# Patient Record
Sex: MALE | Race: WHITE | Employment: OTHER | URBAN - METROPOLITAN AREA
[De-identification: names, ages, dates, MRNs, and addresses within clinical notes are randomized per-mention and may not be internally consistent; named-entity substitution may affect disease eponyms.]

---

## 2018-11-11 ENCOUNTER — HOSPITAL ENCOUNTER (EMERGENCY)
Age: 38
Discharge: HOME | End: 2018-11-12
Payer: COMMERCIAL

## 2018-11-11 VITALS
HEIGHT: 74 IN | SYSTOLIC BLOOD PRESSURE: 150 MMHG | RESPIRATION RATE: 20 BRPM | TEMPERATURE: 98 F | DIASTOLIC BLOOD PRESSURE: 101 MMHG | WEIGHT: 300 LBS | OXYGEN SATURATION: 95 % | BODY MASS INDEX: 38.5 KG/M2 | HEART RATE: 84 BPM

## 2018-11-11 DIAGNOSIS — S05.02XA ABRASION OF LEFT CORNEA, INITIAL ENCOUNTER: Primary | ICD-10-CM

## 2018-11-11 PROCEDURE — 99282 EMERGENCY DEPT VISIT SF MDM: CPT

## 2018-11-11 ASSESSMENT — PAIN DESCRIPTION - ORIENTATION: ORIENTATION: LEFT

## 2018-11-11 ASSESSMENT — VISUAL ACUITY
OS: 20/25
OU: 20/25
OD: 20/25

## 2018-11-11 ASSESSMENT — PAIN SCALES - GENERAL: PAINLEVEL_OUTOF10: 5

## 2018-11-11 ASSESSMENT — PAIN DESCRIPTION - LOCATION: LOCATION: EYE

## 2018-11-11 ASSESSMENT — PAIN DESCRIPTION - PAIN TYPE: TYPE: ACUTE PAIN

## 2018-11-12 PROCEDURE — 6370000000 HC RX 637 (ALT 250 FOR IP): Performed by: NURSE PRACTITIONER

## 2018-11-12 RX ORDER — SULFACETAMIDE SODIUM 100 MG/ML
2 SOLUTION/ DROPS OPHTHALMIC ONCE
Status: COMPLETED | OUTPATIENT
Start: 2018-11-12 | End: 2018-11-12

## 2018-11-12 RX ADMIN — SULFACETAMIDE SODIUM 2 DROP: 100 SOLUTION/ DROPS OPHTHALMIC at 01:05

## 2018-11-14 NOTE — ED PROVIDER NOTES
EVALUATED BY THE EILEEN    CHIEF COMPLAINT  Eye Pain (left)      HISTORY OF PRESENT ILLNESS  Titi Agarwal is a 43 y.o. male who presents to the ED for evaluation of left eye irritation. Patient states like he got something in his left eye prior to arrival in the ER. Patient states that he has been rubbing his eye. No trauma. Does not wear contacts. Patient denies vision changes. Here for further evaluation. No alleviating or aggravating factors noted. No other complaints, modifying factors or associated symptoms. Nursing notes reviewed. History reviewed. No pertinent past medical history. Past Surgical History:   Procedure Laterality Date    HERNIA REPAIR  1985    left     SPINE SURGERY  1998    for spondylolisthesis -      Family History   Problem Relation Age of Onset    Diabetes Mother      Social History     Social History    Marital status:      Spouse name: N/A    Number of children: N/A    Years of education: N/A     Occupational History    Not on file. Social History Main Topics    Smoking status: Never Smoker    Smokeless tobacco: Not on file    Alcohol use 1.2 oz/week     2 Cans of beer per week      Comment: occas - 10/month    Drug use: No    Sexual activity: Not on file     Other Topics Concern    Not on file     Social History Narrative    No narrative on file     No current facility-administered medications for this encounter. No current outpatient prescriptions on file. No Known Allergies    REVIEW OF SYSTEMS  6 systems reviewed, pertinent positives per HPI otherwise noted to be negative    PHYSICAL EXAM  BP (!) 150/101   Pulse 84   Temp 98 °F (36.7 °C) (Oral)   Resp 20   Ht 6' 2\" (1.88 m)   Wt 300 lb (136.1 kg)   SpO2 95%   BMI 38.52 kg/m²   GENERAL APPEARANCE: Awake and alert. Cooperative. No acute distress. HEAD: Normocephalic. Atraumatic. EYES: PERRL. EOM's grossly intact. There is scleral injectio noted to left eye.  Moderate area of

## 2019-03-29 ENCOUNTER — OFFICE VISIT (OUTPATIENT)
Dept: FAMILY MEDICINE CLINIC | Age: 39
End: 2019-03-29
Payer: COMMERCIAL

## 2019-03-29 VITALS
SYSTOLIC BLOOD PRESSURE: 106 MMHG | HEART RATE: 50 BPM | BODY MASS INDEX: 35.99 KG/M2 | OXYGEN SATURATION: 98 % | HEIGHT: 74 IN | WEIGHT: 280.4 LBS | DIASTOLIC BLOOD PRESSURE: 60 MMHG

## 2019-03-29 DIAGNOSIS — B07.9 VIRAL WARTS, UNSPECIFIED TYPE: ICD-10-CM

## 2019-03-29 DIAGNOSIS — Z00.00 WELL ADULT EXAM: Primary | ICD-10-CM

## 2019-03-29 LAB
A/G RATIO: 1.6 (ref 1.1–2.2)
ALBUMIN SERPL-MCNC: 4.5 G/DL (ref 3.4–5)
ALP BLD-CCNC: 96 U/L (ref 40–129)
ALT SERPL-CCNC: 39 U/L (ref 10–40)
ANION GAP SERPL CALCULATED.3IONS-SCNC: 13 MMOL/L (ref 3–16)
AST SERPL-CCNC: 24 U/L (ref 15–37)
BASOPHILS ABSOLUTE: 0 K/UL (ref 0–0.2)
BASOPHILS RELATIVE PERCENT: 0.6 %
BILIRUB SERPL-MCNC: 0.3 MG/DL (ref 0–1)
BILIRUBIN, POC: 0
BLOOD URINE, POC: 0
BUN BLDV-MCNC: 11 MG/DL (ref 7–20)
CALCIUM SERPL-MCNC: 9.3 MG/DL (ref 8.3–10.6)
CHLORIDE BLD-SCNC: 105 MMOL/L (ref 99–110)
CHOLESTEROL, TOTAL: 184 MG/DL (ref 0–199)
CLARITY, POC: CLEAR
CO2: 22 MMOL/L (ref 21–32)
COLOR, POC: NORMAL
CREAT SERPL-MCNC: 0.7 MG/DL (ref 0.9–1.3)
EOSINOPHILS ABSOLUTE: 0.2 K/UL (ref 0–0.6)
EOSINOPHILS RELATIVE PERCENT: 3.4 %
GFR AFRICAN AMERICAN: >60
GFR NON-AFRICAN AMERICAN: >60
GLOBULIN: 2.9 G/DL
GLUCOSE BLD-MCNC: 92 MG/DL (ref 70–99)
GLUCOSE URINE, POC: 0
HCT VFR BLD CALC: 47.8 % (ref 40.5–52.5)
HDLC SERPL-MCNC: 36 MG/DL (ref 40–60)
HEMOGLOBIN: 16.2 G/DL (ref 13.5–17.5)
KETONES, POC: 0
LDL CHOLESTEROL CALCULATED: 120 MG/DL
LEUKOCYTE EST, POC: 0
LYMPHOCYTES ABSOLUTE: 2.9 K/UL (ref 1–5.1)
LYMPHOCYTES RELATIVE PERCENT: 40.4 %
MCH RBC QN AUTO: 29.4 PG (ref 26–34)
MCHC RBC AUTO-ENTMCNC: 33.8 G/DL (ref 31–36)
MCV RBC AUTO: 86.9 FL (ref 80–100)
MONOCYTES ABSOLUTE: 0.6 K/UL (ref 0–1.3)
MONOCYTES RELATIVE PERCENT: 8 %
NEUTROPHILS ABSOLUTE: 3.4 K/UL (ref 1.7–7.7)
NEUTROPHILS RELATIVE PERCENT: 47.6 %
NITRITE, POC: 0
PDW BLD-RTO: 12.9 % (ref 12.4–15.4)
PH, POC: 6.5
PLATELET # BLD: 240 K/UL (ref 135–450)
PLATELET SLIDE REVIEW: ADEQUATE
PMV BLD AUTO: 8.2 FL (ref 5–10.5)
POTASSIUM SERPL-SCNC: 4.6 MMOL/L (ref 3.5–5.1)
PROTEIN, POC: 0
RBC # BLD: 5.49 M/UL (ref 4.2–5.9)
SODIUM BLD-SCNC: 140 MMOL/L (ref 136–145)
SPECIFIC GRAVITY, POC: 1.03
TOTAL PROTEIN: 7.4 G/DL (ref 6.4–8.2)
TRIGL SERPL-MCNC: 138 MG/DL (ref 0–150)
UROBILINOGEN, POC: 0.2
VLDLC SERPL CALC-MCNC: 28 MG/DL
WBC # BLD: 7.1 K/UL (ref 4–11)

## 2019-03-29 PROCEDURE — 99385 PREV VISIT NEW AGE 18-39: CPT | Performed by: FAMILY MEDICINE

## 2019-03-29 PROCEDURE — 81002 URINALYSIS NONAUTO W/O SCOPE: CPT | Performed by: FAMILY MEDICINE

## 2019-03-29 ASSESSMENT — PATIENT HEALTH QUESTIONNAIRE - PHQ9
1. LITTLE INTEREST OR PLEASURE IN DOING THINGS: 0
SUM OF ALL RESPONSES TO PHQ QUESTIONS 1-9: 0
SUM OF ALL RESPONSES TO PHQ9 QUESTIONS 1 & 2: 0
2. FEELING DOWN, DEPRESSED OR HOPELESS: 0
SUM OF ALL RESPONSES TO PHQ QUESTIONS 1-9: 0

## 2019-03-29 NOTE — PROGRESS NOTES
Subjective:      Patient ID: Savi Amin is a 43 y.o. male. HPI   Here for CPE. [place]. Turning 39 this July - no known FH of prostate or colon CA. Wart - near R wrist, did go away, eventually returned, and second one (smaller) more proximal R radial forearm. No self-tx recently. Diet - fair, likes some veggies/fruits, occasional fast foods. Work - outdoor plumbing, doing for a while. Energy decent, and sleeping fair/good, 7-8 hrs/night, no problems. Quit 10-15 yrs ago. Cigarettes - 5 yrs, 1 PP week. Review of Systems   Constitutional: Negative for chills, fatigue and fever. HENT: Negative for ear pain and hearing loss. Eyes: Negative for pain and visual disturbance. Respiratory: Negative for cough and shortness of breath. Cardiovascular: Negative for chest pain and palpitations. Gastrointestinal: Negative for abdominal pain, diarrhea and vomiting. Genitourinary: Negative for difficulty urinating and dysuria. Musculoskeletal: Negative for arthralgias and gait problem. Neurological: Negative for dizziness, weakness and numbness. Psychiatric/Behavioral: Negative for dysphoric mood. The patient is not nervous/anxious. Objective:   Physical Exam   Constitutional: He is oriented to person, place, and time. He appears well-developed and well-nourished. HENT:   Head: Normocephalic and atraumatic. Right Ear: External ear normal.   Left Ear: External ear normal.   Mouth/Throat: Oropharynx is clear and moist.   Eyes: Conjunctivae and EOM are normal. No scleral icterus. Neck: Neck supple. No tracheal deviation present. Cardiovascular: Normal rate, regular rhythm, normal heart sounds and intact distal pulses. Pulmonary/Chest: Effort normal and breath sounds normal.   Abdominal: Soft. There is no tenderness. Lymphadenopathy:     He has no cervical adenopathy. Neurological: He is alert and oriented to person, place, and time. Skin: Skin is warm and dry.    R distal forearm - one larger approx. 4mm norbert. \"stuck-on\" domed hyperkeratotic papule (c/w common mole), and similar but smaller (1-2mm max) domed hyperkeratotic papule mid-radial papule. Psychiatric: He has a normal mood and affect. Nursing note and vitals reviewed. Assessment:      Encounter Diagnoses   Name Primary?  Well adult exam Yes    Viral warts, unspecified type          Plan:      Per orders - await results. Advise good low-fat and low sweets diet, also continue/increase regular activity/exercise as able. If feeling well, and labs stable, then repeat fasting office visit in 6 months, sooner as needed.  Cryo x 2 to the (two) lara          Eduardo Henson MD

## 2019-04-01 ASSESSMENT — ENCOUNTER SYMPTOMS
VOMITING: 0
COUGH: 0
ABDOMINAL PAIN: 0
SHORTNESS OF BREATH: 0
EYE PAIN: 0
DIARRHEA: 0

## 2019-04-01 NOTE — PATIENT INSTRUCTIONS
Advise good low-fat and low sweets diet, also continue/increase regular 3/exercise as able. If feeling well, and labs stable, then repeat fasting office visit in 6 months, sooner seated. For the apparent warts - call or return if any significant increasing redness or pain, also call if no apparent improvement over the next 1-3 weeks, sooner as needed.

## 2019-08-09 ENCOUNTER — PROCEDURE VISIT (OUTPATIENT)
Dept: FAMILY MEDICINE CLINIC | Age: 39
End: 2019-08-09
Payer: COMMERCIAL

## 2019-08-09 VITALS
BODY MASS INDEX: 34.7 KG/M2 | HEIGHT: 74 IN | DIASTOLIC BLOOD PRESSURE: 70 MMHG | HEART RATE: 57 BPM | WEIGHT: 270.4 LBS | OXYGEN SATURATION: 98 % | SYSTOLIC BLOOD PRESSURE: 104 MMHG

## 2019-08-09 DIAGNOSIS — R20.9 DISTURBANCE OF SKIN SENSATION: ICD-10-CM

## 2019-08-09 DIAGNOSIS — B07.9 VIRAL WARTS, UNSPECIFIED TYPE: Primary | ICD-10-CM

## 2019-08-09 PROCEDURE — 17110 DESTRUCTION B9 LES UP TO 14: CPT | Performed by: PHYSICIAN ASSISTANT

## 2019-09-06 ENCOUNTER — OFFICE VISIT (OUTPATIENT)
Dept: FAMILY MEDICINE CLINIC | Age: 39
End: 2019-09-06
Payer: COMMERCIAL

## 2019-09-06 VITALS
HEIGHT: 74 IN | DIASTOLIC BLOOD PRESSURE: 84 MMHG | BODY MASS INDEX: 35.01 KG/M2 | OXYGEN SATURATION: 94 % | SYSTOLIC BLOOD PRESSURE: 110 MMHG | HEART RATE: 99 BPM | WEIGHT: 272.8 LBS

## 2019-09-06 DIAGNOSIS — B07.9 VIRAL WARTS, UNSPECIFIED TYPE: Primary | ICD-10-CM

## 2019-09-06 DIAGNOSIS — R20.9 DISTURBANCE OF SKIN SENSATION: ICD-10-CM

## 2019-09-06 PROCEDURE — 17110 DESTRUCTION B9 LES UP TO 14: CPT | Performed by: PHYSICIAN ASSISTANT

## 2021-02-10 ENCOUNTER — TELEPHONE (OUTPATIENT)
Dept: FAMILY MEDICINE CLINIC | Age: 41
End: 2021-02-10

## 2021-02-10 NOTE — TELEPHONE ENCOUNTER
Was a pt of Dr. Astrid Warren and he has not re-established with anyone else since he left. Pt would like to establish with [facility] if she will accept. He has had a Heavy cough for a week. I advised pt that we would not be able to see him in person since we are a green clinic. He understood.

## 2021-02-11 ENCOUNTER — VIRTUAL VISIT (OUTPATIENT)
Dept: FAMILY MEDICINE CLINIC | Age: 41
End: 2021-02-11
Payer: COMMERCIAL

## 2021-02-11 DIAGNOSIS — Z20.822 ENCOUNTER BY TELEHEALTH FOR SUSPECTED COVID-19: Primary | ICD-10-CM

## 2021-02-11 DIAGNOSIS — R05.9 COUGH: ICD-10-CM

## 2021-02-11 PROCEDURE — 99203 OFFICE O/P NEW LOW 30 MIN: CPT | Performed by: PHYSICIAN ASSISTANT

## 2021-02-11 RX ORDER — BENZONATATE 100 MG/1
100 CAPSULE ORAL 3 TIMES DAILY PRN
Qty: 20 CAPSULE | Refills: 0 | Status: SHIPPED | OUTPATIENT
Start: 2021-02-11 | End: 2021-03-03

## 2021-02-11 SDOH — ECONOMIC STABILITY: FOOD INSECURITY: WITHIN THE PAST 12 MONTHS, YOU WORRIED THAT YOUR FOOD WOULD RUN OUT BEFORE YOU GOT MONEY TO BUY MORE.: NEVER TRUE

## 2021-02-11 SDOH — ECONOMIC STABILITY: TRANSPORTATION INSECURITY
IN THE PAST 12 MONTHS, HAS THE LACK OF TRANSPORTATION KEPT YOU FROM MEDICAL APPOINTMENTS OR FROM GETTING MEDICATIONS?: NO

## 2021-02-11 ASSESSMENT — PATIENT HEALTH QUESTIONNAIRE - PHQ9
2. FEELING DOWN, DEPRESSED OR HOPELESS: 0
1. LITTLE INTEREST OR PLEASURE IN DOING THINGS: 0
SUM OF ALL RESPONSES TO PHQ QUESTIONS 1-9: 0
SUM OF ALL RESPONSES TO PHQ QUESTIONS 1-9: 0

## 2021-02-11 NOTE — PROGRESS NOTES
21     TELEHEALTH EVALUATION -- Audio/Visual (During [facility]- public health emergency)      HPI:  Chief Complaint   Patient presents with    Cough     for one week         Stacia Osler (:  1980) has requested an audio/video evaluation for the following concern(s): as per chief complaint. NEW TO PROVIDER. Began 7 days ago with a dry, hacking cough, nasal congestion, post-nasal drip, and decreased appetite. Cough is productive- green in mornings. Wife has had a similar but less severe cough. Never had anything like this before. Non-smoker. Exposure to COVID?-No known exposure. Remedies tried are mucinex with some relief. ROS: Denies anosmia, fever, myalgias/arthralgias, headache, ear symptoms, purulent nasal discharge, sneezing, sore throat, shortness of breath, wheezing/chest tightness, dizziness, eye symptoms, rash, nausea/vomiting, diarrhea and neck pain. Prior to Visit Medications    Medication Sig Taking? Authorizing Provider   benzonatate (TESSALON) 100 MG capsule Take 1 capsule by mouth 3 times daily as needed for Cough Yes [facility] [place]       Social History     Tobacco Use    Smoking status: Never Smoker    Smokeless tobacco: Never Used   Substance Use Topics    Alcohol use: Yes     Alcohol/week: 0.0 - 1.0 standard drinks     Comment: occas - 5/month    Drug use: No        No Known Allergies, History reviewed. No pertinent past medical history. ,   Past Surgical History:   Procedure Laterality Date    HERNIA REPAIR      left     SPINE SURGERY      for spondylolisthesis -    ,   Social History     Tobacco Use    Smoking status: Never Smoker    Smokeless tobacco: Never Used   Substance Use Topics    Alcohol use:  Yes     Alcohol/week: 0.0 - 1.0 standard drinks     Comment: occas - 5/month    Drug use: No       PHYSICAL EXAMINATION:    Patient-Reported Vitals 2021   Patient-Reported Weight 255.0 lb   Patient-Reported Height 6 2 Patient-Reported Temperature 98.7         Constitutional: [x] Appears well-developed and well-nourished [x] No apparent distress      [] Abnormal- Looks sick, non toxic   Mental status  [x] Alert and awake  [x] Oriented to person/place/time [x]Able to follow commands      Eyes:  EOM    [x]  Normal  [] Abnormal-  Sclera  [x]  Normal  [] Abnormal -         Discharge [x]  None visible  [] Abnormal -    HENT:   [x] Normocephalic, atraumatic. [] Abnormal   [x] Mouth/Throat: Mucous membranes are moist.     External Ears [x] Normal  [] Abnormal-     Neck: [x] No visualized mass     Pulmonary/Chest: [x] Respiratory effort normal.  [x] No visualized signs of difficulty breathing or respiratory distress        [] Abnormal-      Musculoskeletal:   [] Normal gait with no signs of ataxia         [x] Normal range of motion of neck        [] Abnormal-       Neurological:        [x] No Facial Asymmetry (Cranial nerve 7 motor function) (limited exam to video visit)          [x] No gaze palsy        [] Abnormal-         Skin:        [x] No significant exanthematous lesions or discoloration noted on facial skin         [] Abnormal-            Psychiatric:       [x] Normal Affect [x] No Hallucinations        [] Abnormal-         ASSESSMENT/PLAN:  1. Encounter by telehealth for suspected COVID-19  2. Cough    - COVID-19 test now, number given. Have wife tested also  - Symptom relief with OTC meds such as Nyquil,. Tessalon Perles. - Rest, increase fluids. - If worse, call clinic or go to ER if dyspnea  becomes severe. 3. HEALTH MAINTENANCE:   New patient. Set up for CPE with fbw in 2 weeks.     Orders Placed This Encounter   Medications    benzonatate (TESSALON) 100 MG capsule     Sig: Take 1 capsule by mouth 3 times daily as needed for Cough     Dispense:  20 capsule     Refill:  0 Nydia Rush is a 38 y.o. male being evaluated by a Virtual Visit (video visit) encounter to address concerns as mentioned above. A caregiver was present when appropriate. Due to this being a TeleHealth encounter (During XRKOE-97 public health emergency), evaluation of the following organ systems was limited: Vitals/Constitutional/EENT/Resp/CV/GI//MS/Neuro/Skin/Heme-Lymph-Imm. Pursuant to the emergency declaration under the [address] [place] [address] and the [employer] Act, this Virtual Visit was conducted with patient's (and/or legal guardian's) consent, to reduce the patient's risk of exposure to COVID-19 and provide necessary medical care. The patient (and/or legal guardian) has also been advised to contact this office for worsening conditions or problems, and seek emergency medical treatment and/or call 911 if deemed necessary. Patient identification was verified at the start of the visit: Yes    Total time spent on this encounter: Not billed by time    Services were provided through a video synchronous discussion virtually to substitute for in-person clinic visit. Patient and provider were located at their individual homes. --[address] on 2/11/2021 at 2:06 PM    An electronic signature was used to authenticate this note.

## 2021-02-12 ENCOUNTER — OFFICE VISIT (OUTPATIENT)
Dept: PRIMARY CARE CLINIC | Age: 41
End: 2021-02-12
Payer: COMMERCIAL

## 2021-02-12 DIAGNOSIS — Z11.59 SCREENING FOR VIRAL DISEASE: Primary | ICD-10-CM

## 2021-02-12 PROCEDURE — 99211 OFF/OP EST MAY X REQ PHY/QHP: CPT | Performed by: NURSE PRACTITIONER

## 2021-02-12 NOTE — PROGRESS NOTES
Lauren Chavarria received a viral test for COVID-19. They were educated on isolation and quarantine as appropriate. For any symptoms, they were directed to seek care from their PCP, given contact information to establish with a doctor, directed to an urgent care or the emergency room.

## 2021-02-12 NOTE — PATIENT INSTRUCTIONS

## 2021-02-13 LAB — SARS-COV-2, NAA: DETECTED

## 2021-02-16 NOTE — RESULT ENCOUNTER NOTE
Unable to leave message. Voicemail not set up. Your test came back detected/positive for Covid-19. What happens if I have a positive test?  If you have symptoms:  Isolate until all three of these things are true: 1) your symptoms are better, 2) it has been 10 days since you first felt sick, and 3) you have had no fever for at least 24 hours without using medicine that lowers fever. Drink plenty of fluids and eat when you can. You may take medicine for pain or fever if you need to. Rest as much as you can. If you do not have symptoms:  Stay home for 10 days after the date you were tested. If you develop symptoms during those 10 days, stay home until all three of these things are true: 1) your symptoms are better, 2) it has been 10 days since you first felt sick, and 3) you have had no fever for at least 24 hours without using medicine that lowers fever. Follow care instructions from your doctor or other healthcare provider. Seek emergency medical care immediately if you have trouble breathing, persistent pain or pressure in the chest, new confusion, inability to wake or stay awake, or bluish lips or face. Someone from the health department (case investigator or contact tracer) may reach out to you to check on your health and ask about other people you have been around or where you've spent time while you may have been able to spread COVID-19 to others. This person's role is strictly to map the virus to help identify people who may have been exposed to the virus and prevent its spread. The local health department will also provide guidance on how to stay safely at home to avoid spreading illness. Detected results can happen for months and you are not considered contagious. No retest is necessary.

## 2021-03-10 ENCOUNTER — OFFICE VISIT (OUTPATIENT)
Dept: FAMILY MEDICINE CLINIC | Age: 41
End: 2021-03-10
Payer: COMMERCIAL

## 2021-03-10 VITALS
HEIGHT: 74 IN | RESPIRATION RATE: 17 BRPM | WEIGHT: 279 LBS | SYSTOLIC BLOOD PRESSURE: 114 MMHG | BODY MASS INDEX: 35.81 KG/M2 | HEART RATE: 88 BPM | TEMPERATURE: 97.4 F | DIASTOLIC BLOOD PRESSURE: 82 MMHG | OXYGEN SATURATION: 97 %

## 2021-03-10 DIAGNOSIS — Z00.00 ANNUAL PHYSICAL EXAM: Primary | ICD-10-CM

## 2021-03-10 DIAGNOSIS — Z86.16 HISTORY OF COVID-19: ICD-10-CM

## 2021-03-10 PROCEDURE — 36415 COLL VENOUS BLD VENIPUNCTURE: CPT | Performed by: PHYSICIAN ASSISTANT

## 2021-03-10 PROCEDURE — 99396 PREV VISIT EST AGE 40-64: CPT | Performed by: PHYSICIAN ASSISTANT

## 2021-03-10 NOTE — PROGRESS NOTES
spondylolisthesis - birth defect       Family History   Problem Relation Age of Onset    Diabetes Mother     Lung Cancer Maternal Grandfather         smoker    Lung Cancer Paternal Grandmother         smoker    Liver Cancer Paternal Grandfather         Social History     Tobacco Use    Smoking status: Never Smoker    Smokeless tobacco: Never Used   Substance Use Topics    Alcohol use: Yes     Alcohol/week: 0.0 - 1.0 standard drinks     Comment: occas - 5/month        ALLERGIES:    Patient has no known allergies. MEDICATIONS:  No current outpatient medications on file. No current facility-administered medications for this visit. PHYSICAL EXAM:     Wt Readings from Last 3 Encounters:   03/10/21 279 lb (126.6 kg)   09/06/19 272 lb 12.8 oz (123.7 kg)   08/09/19 270 lb 6.4 oz (122.7 kg)     BP Readings from Last 3 Encounters:   03/10/21 114/82   09/06/19 110/84   08/09/19 104/70     Vitals:    03/10/21 0751   BP: 114/82   Site: Left Upper Arm   Position: Sitting   Cuff Size: Large Adult   Pulse: 88   Resp: 17   Temp: 97.4 °F (36.3 °C)   TempSrc: Temporal   SpO2: 97%   Weight: 279 lb (126.6 kg)   Height: 6' 2\" (1.88 m)       Body mass index is 35.82 kg/m². GENERAL APPEARANCE:  Pleasant, friendly. Well-nourished. Looks in no distress. HEENT: Normocephalic. Atraumatic. EYES: Vision intact. EOMI, PERRLA. Conjunctivae pink and moist. Sclera white. Fundoscopic without hemorrhages or edema. Cornea and lens clear. EARS: Auricles symmetrical without lesions or deformities. Canals are clear without erythema. TM's intact bilaterally. Hearing  intact. NOSE: MOUTH/THROAT: currently not evaluated. NECK: No lymphadenopathy. Thyroid smooth, not enlarged. Spine non-tender and full range of motion without pain. LUNGS: Clear to auscultation without wheezes, rales, or rhonchi. Equal resonance to chest percussion bilaterally. CHEST/SPINE: No skin changes or chest wall deformities. No CVAT.  No spine or paraspinal muscle tenderness or deformities. Full range of motion in all planes. HEART:  Regular rate and rhythm. No murmurs, rubs, or gallops. VASCULAR:  No jugular venous distension or carotid bruits. Pulses 2+ and symmetrical to carotid, femoral, and dorsalis pedis. Capillary refill <3secs. ABDOMEN: Without scars. Soft, non-tender, normoactive bowel sounds. No pulsatile masses or hepatosplenomegaly. GENITAL RECTAL EXAM: deferred  EXTREMITIES/BACK: No new skin or bony deformities. No new erythema, edema, or exudates. Symmetrical strength. Full range of motion without eliciting pain. Sensation  and DTR's are equal and intact. Pulses equal and intact. NEUROLOGIC: Grossly non focal. Cranial Nerves II-XII intact. Negative Rhomberg. No pronator drift. Cerebellar functions intact using heel along shin/finger to nose. SKIN: Warm, dry, normal skin turgor. No rashes, petechia, purpura. No suspicious lesions. No nail clubbing or cyanosis. PSYCHIATRIC:  Answers and understands questions appropriately. Normal affect, behavior, and mood. ADDITIONAL DATA:  Prior clinic notes, labs and imaging reviewed. Lipid panel:   Lab Results   Component Value Date    TRIG 138 03/29/2019    HDL 36 03/29/2019    HDL 34 09/24/2010    LDLCALC 120 03/29/2019        ASSESSMENT & PLAN:       Annual physical exam  -     Comprehensive Metabolic Panel  -     Lipid Panel  -     TSH WITH REFLEX TO FT4  - Medical records updated. -Instructed on monthly routine skin and testicular exams.    - Discussed preventative health measures  - recommended 10-20 lb weight loss in the next year and reduce sugary food intake . Has fhx of DM    History of COVID-19  on 2/12/2021  - improved.  No sequelae    Follow up: yearly

## 2021-03-10 NOTE — PATIENT INSTRUCTIONS
Healthy Living Recommendations:  -Exercise 150 minutes per week to include aerobic and weights.  -Food intake to include more plant based and whole grains. Avoid raw sugar. Avoid greasy foods.  -Eye exam every 2 years after age 36.   -Dental exam every 6 months.  -Studies show that obesity, a diet high in red meat and processed foods, tobacco and alcohol abuse, and a sedentary lifestyle increase the risk of developing colorectal cancer.

## 2021-03-11 LAB
A/G RATIO: 1.4 (ref 1.1–2.2)
ALBUMIN SERPL-MCNC: 4.3 G/DL (ref 3.4–5)
ALP BLD-CCNC: 89 U/L (ref 40–129)
ALT SERPL-CCNC: 54 U/L (ref 10–40)
ANION GAP SERPL CALCULATED.3IONS-SCNC: 7 MMOL/L (ref 3–16)
AST SERPL-CCNC: 27 U/L (ref 15–37)
BILIRUB SERPL-MCNC: <0.2 MG/DL (ref 0–1)
BUN BLDV-MCNC: 11 MG/DL (ref 7–20)
CALCIUM SERPL-MCNC: 9.2 MG/DL (ref 8.3–10.6)
CHLORIDE BLD-SCNC: 103 MMOL/L (ref 99–110)
CHOLESTEROL, TOTAL: 191 MG/DL (ref 0–199)
CO2: 27 MMOL/L (ref 21–32)
CREAT SERPL-MCNC: 0.8 MG/DL (ref 0.9–1.3)
GFR AFRICAN AMERICAN: >60
GFR NON-AFRICAN AMERICAN: >60
GLOBULIN: 3.1 G/DL
GLUCOSE BLD-MCNC: 109 MG/DL (ref 70–99)
HDLC SERPL-MCNC: 32 MG/DL (ref 40–60)
LDL CHOLESTEROL CALCULATED: 118 MG/DL
POTASSIUM SERPL-SCNC: 4.6 MMOL/L (ref 3.5–5.1)
SODIUM BLD-SCNC: 137 MMOL/L (ref 136–145)
TOTAL PROTEIN: 7.4 G/DL (ref 6.4–8.2)
TRIGL SERPL-MCNC: 205 MG/DL (ref 0–150)
TSH REFLEX FT4: 1.29 UIU/ML (ref 0.27–4.2)
VLDLC SERPL CALC-MCNC: 41 MG/DL

## 2021-07-07 ENCOUNTER — OFFICE VISIT (OUTPATIENT)
Dept: FAMILY MEDICINE CLINIC | Age: 41
End: 2021-07-07
Payer: COMMERCIAL

## 2021-07-07 VITALS
HEIGHT: 74 IN | OXYGEN SATURATION: 98 % | BODY MASS INDEX: 35.27 KG/M2 | RESPIRATION RATE: 14 BRPM | TEMPERATURE: 98.1 F | HEART RATE: 67 BPM | DIASTOLIC BLOOD PRESSURE: 82 MMHG | SYSTOLIC BLOOD PRESSURE: 128 MMHG | WEIGHT: 274.8 LBS

## 2021-07-07 DIAGNOSIS — H92.02 OTALGIA OF LEFT EAR: ICD-10-CM

## 2021-07-07 DIAGNOSIS — H61.23 HEARING LOSS DUE TO CERUMEN IMPACTION, BILATERAL: Primary | ICD-10-CM

## 2021-07-07 PROCEDURE — 69209 REMOVE IMPACTED EAR WAX UNI: CPT | Performed by: PHYSICIAN ASSISTANT

## 2021-07-07 PROCEDURE — 99213 OFFICE O/P EST LOW 20 MIN: CPT | Performed by: PHYSICIAN ASSISTANT

## 2021-07-07 NOTE — PROGRESS NOTES
[facility]  Chief Complaint   Patient presents with    Ear Fullness     Pt states left ear feels clogged and can't hear out of        HISTORY OF PRESENT  ILLNESS  Guru Mcfarland is a 38 y.o.  male presenting with a feeling of his L ear being clogged and hearing loss that began one month ago. History ear infections in adulthood as well as cerumen build up. Been years since the last build up. Has not tried to remove it himself. Denies fever, cough, congestion, runny nose, and sore throat. Denies ear pain. Has not been swimming. ROS:  Remaining 14 ROS were reviewed and are unremarkable for other constitutional, EENT, cardiac, pulmonary, GI, , neurologic, musculoskeletal, or integumentary complaints. PAST MEDICAL/SURGICAL, SOCIAL, &  FAMILY HISTORY:  Reviewed and updated accordingly. ALLERGIES :   Patient has no known allergies. MEDICATIONS:  Prior to Visit Medications    Not on File         PHYSICAL EXAM     Vitals:    07/07/21 0807   BP: 128/82   Site: Right Upper Arm   Position: Sitting   Pulse: 67   Resp: 14   Temp: 98.1 °F (36.7 °C)   SpO2: 98%   Weight: 274 lb 12.8 oz (124.6 kg)   Height: 6' 2\" (1.88 m)   Body mass index is 35.28 kg/m². APPEARANCE: Pleasant, friendly, well-nourished. No acute distress. HEENT: Head: Normocephalic, atraumatic. Eyes: EOMI. Conjunctiva pink and moist. Sclera white. Ears: No erythema, edema, lesions or scarring on external ears. Negative pinna pull bilaterally. Canals impacted with cerumen. Decreased hearing. Post irrigation: Canals clear. TM intact bilaterally. L TM scarred. Nose/ Mouth: not examined today. NECK: No lymphadenopathy or masses. Moves neck fully. HEART: Reg rate and rhythm. No murmurs, rubs, or gallops. LUNGS: Clear to auscultation. No wheezes, rales, or rhonchi. MUSCULOSKELETAL:  No clubbing, cyanosis or edema. Moves all joints.   NEUROLOGIC: Normal gross and sensory exam.  SKIN: Warm, dry, normal turgor. Cap refill <3secs. No rashes, petechiae, purpura. ADDITIONAL STUDIES     Pertinent prior laboratory results and/or imaging reviewed. Procedure note   A solution of warm water and hydrogen peroxide was prepared. The L ear was irrigated with this solution and the remaining cerumen was removed with a curette by Chantale CORREA.   R ear cerumen was removed with a curette by Felisha Magdaleno PA-C and Chantale CORREA. IMPRESSION/ PLAN  Bilateral otalgia and hearing loss secondary to cerumen impaction   -use a wash cloth with hot water and soap to help keep canals clear and dry after  -do not use q-tips   -follow-up prn     Patient case seen and discussed under supervision of preceptor, Preeti Shleton. ORTIZ Mc. Note made by physician assistant student, Chantale Castro, in the status of a scribe, with review by preceptor.

## 2022-03-14 ENCOUNTER — OFFICE VISIT (OUTPATIENT)
Dept: FAMILY MEDICINE CLINIC | Age: 42
End: 2022-03-14
Payer: COMMERCIAL

## 2022-03-14 VITALS
WEIGHT: 280.2 LBS | HEIGHT: 74 IN | DIASTOLIC BLOOD PRESSURE: 80 MMHG | TEMPERATURE: 97.8 F | OXYGEN SATURATION: 97 % | SYSTOLIC BLOOD PRESSURE: 122 MMHG | BODY MASS INDEX: 35.96 KG/M2 | HEART RATE: 74 BPM

## 2022-03-14 DIAGNOSIS — E66.09 CLASS 2 OBESITY DUE TO EXCESS CALORIES WITH BODY MASS INDEX (BMI) OF 36.0 TO 36.9 IN ADULT, UNSPECIFIED WHETHER SERIOUS COMORBIDITY PRESENT: ICD-10-CM

## 2022-03-14 DIAGNOSIS — Z00.00 ANNUAL PHYSICAL EXAM: Primary | ICD-10-CM

## 2022-03-14 PROCEDURE — 99396 PREV VISIT EST AGE 40-64: CPT | Performed by: PHYSICIAN ASSISTANT

## 2022-03-14 ASSESSMENT — PATIENT HEALTH QUESTIONNAIRE - PHQ9
8. MOVING OR SPEAKING SO SLOWLY THAT OTHER PEOPLE COULD HAVE NOTICED. OR THE OPPOSITE, BEING SO FIGETY OR RESTLESS THAT YOU HAVE BEEN MOVING AROUND A LOT MORE THAN USUAL: 0
10. IF YOU CHECKED OFF ANY PROBLEMS, HOW DIFFICULT HAVE THESE PROBLEMS MADE IT FOR YOU TO DO YOUR WORK, TAKE CARE OF THINGS AT HOME, OR GET ALONG WITH OTHER PEOPLE: 0
5. POOR APPETITE OR OVEREATING: 0
2. FEELING DOWN, DEPRESSED OR HOPELESS: 0
4. FEELING TIRED OR HAVING LITTLE ENERGY: 0
SUM OF ALL RESPONSES TO PHQ QUESTIONS 1-9: 0
1. LITTLE INTEREST OR PLEASURE IN DOING THINGS: 0
9. THOUGHTS THAT YOU WOULD BE BETTER OFF DEAD, OR OF HURTING YOURSELF: 0
SUM OF ALL RESPONSES TO PHQ QUESTIONS 1-9: 0
SUM OF ALL RESPONSES TO PHQ QUESTIONS 1-9: 0
SUM OF ALL RESPONSES TO PHQ9 QUESTIONS 1 & 2: 0
3. TROUBLE FALLING OR STAYING ASLEEP: 0
7. TROUBLE CONCENTRATING ON THINGS, SUCH AS READING THE NEWSPAPER OR WATCHING TELEVISION: 0
SUM OF ALL RESPONSES TO PHQ QUESTIONS 1-9: 0
6. FEELING BAD ABOUT YOURSELF - OR THAT YOU ARE A FAILURE OR HAVE LET YOURSELF OR YOUR FAMILY DOWN: 0

## 2022-03-14 NOTE — PROGRESS NOTES
[facility] EXAMINATION         Date of Exam: 3/14/2022         CC:  Chief Complaint   Patient presents with    Annual Exam     Patient is here for a physical, he is not fasting for blood work          HPI: Reji Campbell 1980 is a 42 y.o. male who presents for a annual preventive health medical examination. No new complaints. New little girl 7 mo old. PREVENTIVE HEALTH:   Last eye exam:    2017  Hearing concerns: No  Last Dental exam:    Every 6 Months   Caffeine use:  >3/ day reg pop. 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 Diet: feels he needs improvement on  Cut down on sugars, has a sweet tooth  Alcohol use: Yes  1 per week. Tobacco/ Vapping use:  No Never   Cognition/ Mini Mental; concerns about forgetfulness?    no  Mental Health; concerns of anxiety or depression? no  Perform routine skin checks? No  Perform monthly testicular exams: no  Colonoscopy:  no famhx  Seatbelt use: Yes  Living will: no,   discussed            REVIEW OF SYSTEMS:  Pertinent positive and negatives are in HPI. Remaining 14 ROS were reviewed and are unremarkable for other constitutional, EENT, cardiac, pulmonary, GI, , neurologic, musculoskeletal, or integumentary complaints. PAST MEDICAL HISTORY:  No past medical history on file. Past Surgical History:   Procedure Laterality Date    INGUINAL HERNIA REPAIR Left 1984    SPINE SURGERY  01/01/1998    for spondylolisthesis - birth defect       Social and Family History: reviewed. ALLERGIES:    Patient has no known allergies. MEDICATIONS:  No current outpatient medications on file. No current facility-administered medications for this visit. PHYSICAL EXAM:   Vitals:    03/14/22 0832   BP: 122/80   Pulse: 74   Temp: 97.8 °F (36.6 °C)   TempSrc: Temporal   SpO2: 97%   Weight: 280 lb 3.2 oz (127.1 kg)   Height: 6' 2\" (1.88 m)      Body mass index is 35.98 kg/m². GENERAL APPEARANCE:  Well-nourished.   No distress. HEENT: Normocephalic. Atraumatic. EYES: Vision intact. EOMI, PERRLA. Conjunctivae pink and moist. Sclera white. Cornea and lens clear. EARS: Auricles symmetrical without lesions or deformities. Canals are clear without erythema. TM's intact bilaterally. Hearing  intact. NOSE: patent and clear. MOUTH: moist. Teeth intact   Throat clear. NECK: No lymphadenopathy. Thyroid smooth, not enlarged. LUNGS: Clear to auscultation. No wheezes, rales, or rhonchi. Equal resonance to chest percussion. CHEST/SPINE: No skin changes or chest wall deformities. No CVAT. No spine or paraspinal muscle tenderness or deformities. Full range of motion in all planes. HEART:  Regular rate and rhythm. No murmurs, rubs, or gallops. VASCULAR:  No jugular venous distension or carotid bruits. Pulses equal and symmetrical upper and lower extremities. Capillary refill <3secs. ABDOMEN: Truncal obesity. Without scars. Soft, non-tender, normoactive bowel sounds. No pulsatile masses or hepatosplenomegaly. GENITAL / RECTAL EXAM:  Deferred. EXTREMITIES/BACK: No new skin or bony deformities. Symmetrical strength. Full range of motion without eliciting pain. Sensation  and DTR's are equal and intact. NEUROLOGIC: Grossly non focal. Cranial Nerves II-XII intact. Negative Rhomberg. SKIN: Warm, dry, normal skin turgor. No rashes, petechia, purpura. No suspicious lesions. No nail clubbing or cyanosis. PSYCHIATRIC:  Answers and understands questions appropriately. Normal affect, behavior, and mood. ADDITIONAL DATA:  Prior clinic notes, labs and imaging reviewed. Lipid panel:   Lab Results   Component Value Date    TRIG 205 03/10/2021    HDL 32 03/10/2021    HDL 34 09/24/2010    LDLCALC 118 03/10/2021        ASSESSMENT & PLAN:    Alejandra Johnson was seen today for annual exam.         Annual physical exam  -     Comprehensive Metabolic Panel; Future  -     Lipid Panel; Future  - medical records updated.   - discussed healthy living recommendations. Class 2 obesity due to excess calories with body mass index (BMI) of 36  Must cut out all sweet and diet sodas. Discussed BMI goal for weight loss.      Follow up: yearly

## 2022-03-14 NOTE — PATIENT INSTRUCTIONS
Must lose weight. Stop all sweet sodas. Healthy Living Recommendations:    -Exercise 150 minutes/ week to include aerobic and weights. Body Mass Index (BMI) should be 25 or less. -Diet: Eat more Whole Food Plant-Based. Avoid foods made with raw sugar such as candy, cookies, and drinks with sugar. Avoid greasy, fried, and processed foods. Studies show that obesity ,  diets high in red meat and processed foods, tobacco use, alcohol abuse, and a sedentary lifestyle WILL increase the risk of developing heart and liver disease and increased cancers.  -Eye exam every 2 years over age 36.   -Dental exam every 6 months.  -Colonoscopy at age 39    Females: perform monthly skin exam,  self breast exam and yearly mammograms  Males: perform monthly skin exam and testicular exam.      NEW to PROVIDER:     [place] [facility], [address], [address] [place] [address]  Office hours are: Monday - Friday 7 am- 5 pm. Phone lines turn on at 8 am.   Office Sunil 30: [phone]     -Please call your pharmacy \ for medication refills.  -Please be sure to call our office if your illness/problem that has been treated has not completely resolved. -Bring an accurate list of your medications with you at every appointment to ensure that we have the correct information.  -Arrive 15 minutes prior to appointments.

## 2022-03-17 ENCOUNTER — NURSE ONLY (OUTPATIENT)
Dept: FAMILY MEDICINE CLINIC | Age: 42
End: 2022-03-17
Payer: COMMERCIAL

## 2022-03-17 DIAGNOSIS — Z00.00 ANNUAL PHYSICAL EXAM: ICD-10-CM

## 2022-03-17 LAB
A/G RATIO: 1.6 (ref 1.1–2.2)
ALBUMIN SERPL-MCNC: 4.5 G/DL (ref 3.4–5)
ALP BLD-CCNC: 96 U/L (ref 40–129)
ALT SERPL-CCNC: 32 U/L (ref 10–40)
ANION GAP SERPL CALCULATED.3IONS-SCNC: 13 MMOL/L (ref 3–16)
AST SERPL-CCNC: 20 U/L (ref 15–37)
BILIRUB SERPL-MCNC: 0.4 MG/DL (ref 0–1)
BUN BLDV-MCNC: 16 MG/DL (ref 7–20)
CALCIUM SERPL-MCNC: 9.4 MG/DL (ref 8.3–10.6)
CHLORIDE BLD-SCNC: 102 MMOL/L (ref 99–110)
CHOLESTEROL, TOTAL: 204 MG/DL (ref 0–199)
CO2: 22 MMOL/L (ref 21–32)
CREAT SERPL-MCNC: 0.9 MG/DL (ref 0.9–1.3)
GFR AFRICAN AMERICAN: >60
GFR NON-AFRICAN AMERICAN: >60
GLUCOSE BLD-MCNC: 103 MG/DL (ref 70–99)
HDLC SERPL-MCNC: 35 MG/DL (ref 40–60)
LDL CHOLESTEROL CALCULATED: 137 MG/DL
POTASSIUM SERPL-SCNC: 4.3 MMOL/L (ref 3.5–5.1)
SODIUM BLD-SCNC: 137 MMOL/L (ref 136–145)
TOTAL PROTEIN: 7.3 G/DL (ref 6.4–8.2)
TRIGL SERPL-MCNC: 160 MG/DL (ref 0–150)
VLDLC SERPL CALC-MCNC: 32 MG/DL

## 2022-03-17 PROCEDURE — 36415 COLL VENOUS BLD VENIPUNCTURE: CPT | Performed by: PHYSICIAN ASSISTANT

## 2022-05-09 ENCOUNTER — OFFICE VISIT (OUTPATIENT)
Dept: FAMILY MEDICINE CLINIC | Age: 42
End: 2022-05-09
Payer: COMMERCIAL

## 2022-05-09 VITALS
HEART RATE: 55 BPM | OXYGEN SATURATION: 98 % | BODY MASS INDEX: 34.57 KG/M2 | HEIGHT: 74 IN | RESPIRATION RATE: 18 BRPM | TEMPERATURE: 97.5 F | WEIGHT: 269.4 LBS | DIASTOLIC BLOOD PRESSURE: 72 MMHG | SYSTOLIC BLOOD PRESSURE: 118 MMHG

## 2022-05-09 DIAGNOSIS — H61.23 HEARING LOSS DUE TO CERUMEN IMPACTION, BILATERAL: Primary | ICD-10-CM

## 2022-05-09 PROCEDURE — 69209 REMOVE IMPACTED EAR WAX UNI: CPT | Performed by: PHYSICIAN ASSISTANT

## 2022-05-09 PROCEDURE — 99213 OFFICE O/P EST LOW 20 MIN: CPT | Performed by: PHYSICIAN ASSISTANT

## 2022-05-09 NOTE — PROGRESS NOTES
[address] COMPLAINT  Chief Complaint   Patient presents with    Ear Fullness     pt states that his left ear has felt clogged for the past month or so, no pain. HISTORY OF PRESENT  ILLNESS  Joey Skinner 42 y.o.  with hearing loss and otalgia steadily worsening for months  on the left  Has history of cerumen impaction. Denies URI symptoms. No fever. ROS:  Remaining are unremarkable for  other constitutional, EENT, cardiac, pulmonary, GI, , neurologic, musculoskeletal, or integumentary complaints. Past History/Medications/Allergies- reviewed and updated      PHYSICAL EXAM:     Vitals:    05/09/22 0950   BP: 118/72   Pulse: 55   Resp: 18   Temp: 97.5 °F (36.4 °C)   TempSrc: Temporal   SpO2: 98%   Weight: 269 lb 6.4 oz (122.2 kg)   Height: 6' 2\" (1.88 m)   Body mass index is 34.59 kg/m². APPEARANCE: Well nourished. No distress. Normocephalic. EOMI, PERRLA. Oral mucosa moist. Throat clear. Ears: Negative pinna pull. Cerumen impaction left   After irrigation and curette usage the TM's are intact and hearing improved. NECK: No cervical lymphadenopathy  HEART: Rate and rhythm no murmurs rubs or gallops. LUNGS: Clear to auscultation no wheezes rales or rhonchi. NEUROLOGIC: Grossly nonfocal.  SKIN: Warm and dry, capillary refill <2 seconds. No rashes, petechiae, skin turgor      ASSESSMENT/PLAN:     Hearing loss and otalgia due to cerumen impaction    - symptoms improved after irrigation. No other acute findings. - discussed preventative techniques.

## 2022-07-20 ENCOUNTER — TELEPHONE (OUTPATIENT)
Dept: FAMILY MEDICINE CLINIC | Age: 42
End: 2022-07-20

## 2022-07-20 ENCOUNTER — TELEMEDICINE (OUTPATIENT)
Dept: FAMILY MEDICINE CLINIC | Age: 42
End: 2022-07-20
Payer: COMMERCIAL

## 2022-07-20 DIAGNOSIS — U07.1 COVID-19: Primary | ICD-10-CM

## 2022-07-20 PROCEDURE — 99213 OFFICE O/P EST LOW 20 MIN: CPT | Performed by: PHYSICIAN ASSISTANT

## 2022-07-20 NOTE — TELEPHONE ENCOUNTER
----- Message from Maine Frost sent at 7/20/2022 11:41 AM EDT -----  Subject: Message to Provider    QUESTIONS  Information for Provider? Patient had tested positive for covid today with   a at home covid test and he is requesting if he could received the   medication to help with the virus. Please send to [address] [place] [address], [address] [facility],[address]. Patient stated he is experiencing grogginess. Please call patient to further advise.   ---------------------------------------------------------------------------  --------------  Rene DUKE  [phone]; OK to leave message on voicemail  ---------------------------------------------------------------------------  --------------  SCRIPT ANSWERS  Relationship to Patient?  Self

## 2022-07-20 NOTE — PROGRESS NOTES
Normal Affect [x] No Hallucinations        ASSESSMENT/PLAN:  1. COVID-19         - Discussed CDC guidelines for quarantine. Anyone exposed to you must quarantine minimum of 5 days, if still symptomatic 7 days and wear mask around others for 14 days no matter what. - Symptom relief with OTC meds. Add daily Airborne and/or MVI. - Rest, increase fluids. - If worse, call clinic or go to ER if dyspnea  becomes severe. Pb Denton is a 36 y.o. male being evaluated by a Virtual Visit (video visit) encounter to address concerns as mentioned above. A caregiver was present when appropriate. Due to this being a TeleHealth encounter (During [facility] public health emergency), evaluation of the following organ systems was limited: Vitals/Constitutional/EENT/Resp/CV/GI//MS/Neuro/Skin/Heme-Lymph-Imm. Pursuant to the emergency declaration under the [address] [place] [address], [address] [place] [address] authority and the [employer] Act, this Virtual Visit was conducted with patient's (and/or legal guardian's) consent, to reduce the patient's risk of exposure to COVID-19 and provide necessary medical care. The patient (and/or legal guardian) has also been advised to contact this office for worsening conditions or problems, and seek emergency medical treatment and/or call 911 if deemed necessary. Patient identification was verified at the start of the visit: Yes  Total time spent on this encounter: Not billed by time  Services were provided through a video synchronous discussion virtually to substitute for in-person clinic visit. Patient and provider were located at their individual homes. --CUCO Mcbride on 7/20/2022 at 1:08 PM    An electronic signature was used to authenticate this note.

## 2023-03-20 ENCOUNTER — OFFICE VISIT (OUTPATIENT)
Dept: FAMILY MEDICINE CLINIC | Age: 43
End: 2023-03-20

## 2023-03-20 VITALS
RESPIRATION RATE: 18 BRPM | HEIGHT: 74 IN | OXYGEN SATURATION: 94 % | HEART RATE: 73 BPM | BODY MASS INDEX: 36.55 KG/M2 | WEIGHT: 284.8 LBS | SYSTOLIC BLOOD PRESSURE: 118 MMHG | DIASTOLIC BLOOD PRESSURE: 80 MMHG | TEMPERATURE: 98 F

## 2023-03-20 DIAGNOSIS — E66.01 SEVERE OBESITY (BMI 35.0-39.9) WITH COMORBIDITY (HCC): ICD-10-CM

## 2023-03-20 DIAGNOSIS — Z00.00 ANNUAL PHYSICAL EXAM: Primary | ICD-10-CM

## 2023-03-20 DIAGNOSIS — E78.5 HYPERLIPIDEMIA, UNSPECIFIED HYPERLIPIDEMIA TYPE: ICD-10-CM

## 2023-03-20 DIAGNOSIS — R73.09 ELEVATED GLUCOSE: ICD-10-CM

## 2023-03-20 LAB
ALBUMIN SERPL-MCNC: 4.2 G/DL (ref 3.4–5)
ALBUMIN/GLOB SERPL: 1.4 {RATIO} (ref 1.1–2.2)
ALP SERPL-CCNC: 93 U/L (ref 40–129)
ALT SERPL-CCNC: 30 U/L (ref 10–40)
ANION GAP SERPL CALCULATED.3IONS-SCNC: 11 MMOL/L (ref 3–16)
AST SERPL-CCNC: 17 U/L (ref 15–37)
BILIRUB SERPL-MCNC: <0.2 MG/DL (ref 0–1)
BUN SERPL-MCNC: 13 MG/DL (ref 7–20)
CALCIUM SERPL-MCNC: 9.2 MG/DL (ref 8.3–10.6)
CHLORIDE SERPL-SCNC: 103 MMOL/L (ref 99–110)
CHOLEST SERPL-MCNC: 181 MG/DL (ref 0–199)
CO2 SERPL-SCNC: 26 MMOL/L (ref 21–32)
CREAT SERPL-MCNC: 1.1 MG/DL (ref 0.9–1.3)
GFR SERPLBLD CREATININE-BSD FMLA CKD-EPI: >60 ML/MIN/{1.73_M2}
GLUCOSE SERPL-MCNC: 115 MG/DL (ref 70–99)
HDLC SERPL-MCNC: 32 MG/DL (ref 40–60)
LDLC SERPL CALC-MCNC: 105 MG/DL
POTASSIUM SERPL-SCNC: 4.6 MMOL/L (ref 3.5–5.1)
PROT SERPL-MCNC: 7.1 G/DL (ref 6.4–8.2)
SODIUM SERPL-SCNC: 140 MMOL/L (ref 136–145)
TRIGL SERPL-MCNC: 222 MG/DL (ref 0–150)
VLDLC SERPL CALC-MCNC: 44 MG/DL

## 2023-03-20 ASSESSMENT — PATIENT HEALTH QUESTIONNAIRE - PHQ9
8. MOVING OR SPEAKING SO SLOWLY THAT OTHER PEOPLE COULD HAVE NOTICED. OR THE OPPOSITE, BEING SO FIGETY OR RESTLESS THAT YOU HAVE BEEN MOVING AROUND A LOT MORE THAN USUAL: 0
SUM OF ALL RESPONSES TO PHQ9 QUESTIONS 1 & 2: 0
3. TROUBLE FALLING OR STAYING ASLEEP: 0
5. POOR APPETITE OR OVEREATING: 0
4. FEELING TIRED OR HAVING LITTLE ENERGY: 0
SUM OF ALL RESPONSES TO PHQ QUESTIONS 1-9: 0
6. FEELING BAD ABOUT YOURSELF - OR THAT YOU ARE A FAILURE OR HAVE LET YOURSELF OR YOUR FAMILY DOWN: 0
SUM OF ALL RESPONSES TO PHQ QUESTIONS 1-9: 0
10. IF YOU CHECKED OFF ANY PROBLEMS, HOW DIFFICULT HAVE THESE PROBLEMS MADE IT FOR YOU TO DO YOUR WORK, TAKE CARE OF THINGS AT HOME, OR GET ALONG WITH OTHER PEOPLE: 0
SUM OF ALL RESPONSES TO PHQ QUESTIONS 1-9: 0
7. TROUBLE CONCENTRATING ON THINGS, SUCH AS READING THE NEWSPAPER OR WATCHING TELEVISION: 0
2. FEELING DOWN, DEPRESSED OR HOPELESS: 0
SUM OF ALL RESPONSES TO PHQ QUESTIONS 1-9: 0
1. LITTLE INTEREST OR PLEASURE IN DOING THINGS: 0
9. THOUGHTS THAT YOU WOULD BE BETTER OFF DEAD, OR OF HURTING YOURSELF: 0

## 2023-03-20 NOTE — PATIENT INSTRUCTIONS
Healthy Living Recommendations:  Exercise 150 minutes/ week to include aerobic and weights. Body Mass Index (BMI) should be 25 or less. Nutrition : moderation in sodium/caffeine intake, saturated fat and cholesterol, caloric balance, sufficient intake of fresh fruits, vegetables, fiber, calcium, iron, and 1 mg of folate supplement per day (for females capable of pregnancy). :  Studies show diets high in red meat and processed foods, tobacco use, alcohol abuse, and a sedentary lifestyle WILL increase the risk heart and liver diseases, cancers, and dementia. Eye exam every 2 years over age 36. Dental  Perform  regular tooth brushing and flossing daily. Dentist exam every 6 months. Colonoscopy Begin at age 39    Females: perform monthly skin & self breast exams. Yearly mammograms begin age 36. Males: perform monthly skin exam and testicular exam. Urinary changes can be a sign of prostate issues. NEW to PROVIDER:   [facility]   [address] [facility], [address] [place] [address]  Office hours are: Monday - Friday 7 am- 5 pm. Phone lines turn on at 8 am.   Office Sunil 30: [phone]   -Please call your pharmacy for medication refills. - Make follow up appointment if  illness/problem treated has not resolved. -Bring  your medications with you to every appointment to ensure that we have the correct information.  -Arrive 15 minutes prior to appointments.

## 2023-10-30 ENCOUNTER — OFFICE VISIT (OUTPATIENT)
Dept: FAMILY MEDICINE CLINIC | Age: 43
End: 2023-10-30
Payer: COMMERCIAL

## 2023-10-30 VITALS
BODY MASS INDEX: 37.96 KG/M2 | SYSTOLIC BLOOD PRESSURE: 112 MMHG | DIASTOLIC BLOOD PRESSURE: 70 MMHG | RESPIRATION RATE: 18 BRPM | OXYGEN SATURATION: 98 % | TEMPERATURE: 98.2 F | WEIGHT: 295.8 LBS | HEIGHT: 74 IN | HEART RATE: 74 BPM

## 2023-10-30 DIAGNOSIS — H61.22 HEARING LOSS OF LEFT EAR DUE TO CERUMEN IMPACTION: Primary | ICD-10-CM

## 2023-10-30 DIAGNOSIS — B07.9 VERRUCA: ICD-10-CM

## 2023-10-30 DIAGNOSIS — R23.8 SKIN IRRITATION: ICD-10-CM

## 2023-10-30 PROCEDURE — 11200 RMVL SKIN TAGS UP TO&INC 15: CPT | Performed by: PHYSICIAN ASSISTANT

## 2023-10-30 PROCEDURE — 69209 REMOVE IMPACTED EAR WAX UNI: CPT | Performed by: PHYSICIAN ASSISTANT

## 2023-10-30 PROCEDURE — 99999 PR OFFICE/OUTPT VISIT,PROCEDURE ONLY: CPT | Performed by: PHYSICIAN ASSISTANT

## 2023-10-30 SDOH — ECONOMIC STABILITY: FOOD INSECURITY: WITHIN THE PAST 12 MONTHS, THE FOOD YOU BOUGHT JUST DIDN'T LAST AND YOU DIDN'T HAVE MONEY TO GET MORE.: NEVER TRUE

## 2023-10-30 SDOH — ECONOMIC STABILITY: FOOD INSECURITY: WITHIN THE PAST 12 MONTHS, YOU WORRIED THAT YOUR FOOD WOULD RUN OUT BEFORE YOU GOT MONEY TO BUY MORE.: NEVER TRUE

## 2023-10-30 SDOH — ECONOMIC STABILITY: INCOME INSECURITY: HOW HARD IS IT FOR YOU TO PAY FOR THE VERY BASICS LIKE FOOD, HOUSING, MEDICAL CARE, AND HEATING?: NOT HARD AT ALL

## 2023-10-30 SDOH — ECONOMIC STABILITY: HOUSING INSECURITY
IN THE LAST 12 MONTHS, WAS THERE A TIME WHEN YOU DID NOT HAVE A STEADY PLACE TO SLEEP OR SLEPT IN A SHELTER (INCLUDING NOW)?: NO

## 2023-10-30 ASSESSMENT — PATIENT HEALTH QUESTIONNAIRE - PHQ9
1. LITTLE INTEREST OR PLEASURE IN DOING THINGS: 0
SUM OF ALL RESPONSES TO PHQ QUESTIONS 1-9: 0
SUM OF ALL RESPONSES TO PHQ QUESTIONS 1-9: 0
SUM OF ALL RESPONSES TO PHQ9 QUESTIONS 1 & 2: 0
SUM OF ALL RESPONSES TO PHQ QUESTIONS 1-9: 0
SUM OF ALL RESPONSES TO PHQ QUESTIONS 1-9: 0
2. FEELING DOWN, DEPRESSED OR HOPELESS: 0

## 2024-03-25 ENCOUNTER — OFFICE VISIT (OUTPATIENT)
Dept: FAMILY MEDICINE CLINIC | Age: 44
End: 2024-03-25
Payer: COMMERCIAL

## 2024-03-25 VITALS
RESPIRATION RATE: 18 BRPM | OXYGEN SATURATION: 96 % | SYSTOLIC BLOOD PRESSURE: 118 MMHG | HEART RATE: 77 BPM | TEMPERATURE: 98.2 F | WEIGHT: 283.4 LBS | DIASTOLIC BLOOD PRESSURE: 70 MMHG | BODY MASS INDEX: 36.37 KG/M2 | HEIGHT: 74 IN

## 2024-03-25 DIAGNOSIS — Z00.00 ANNUAL PHYSICAL EXAM: Primary | ICD-10-CM

## 2024-03-25 DIAGNOSIS — E66.01 SEVERE OBESITY (BMI 35.0-39.9) WITH COMORBIDITY (HCC): ICD-10-CM

## 2024-03-25 DIAGNOSIS — R73.09 ELEVATED GLUCOSE: ICD-10-CM

## 2024-03-25 DIAGNOSIS — E78.5 HYPERLIPIDEMIA, UNSPECIFIED HYPERLIPIDEMIA TYPE: ICD-10-CM

## 2024-03-25 LAB
ALBUMIN SERPL-MCNC: 4.8 G/DL (ref 3.4–5)
ALBUMIN/GLOB SERPL: 1.5 {RATIO} (ref 1.1–2.2)
ALP SERPL-CCNC: 114 U/L (ref 40–129)
ALT SERPL-CCNC: 34 U/L (ref 10–40)
ANION GAP SERPL CALCULATED.3IONS-SCNC: 16 MMOL/L (ref 3–16)
AST SERPL-CCNC: 18 U/L (ref 15–37)
BILIRUB SERPL-MCNC: 0.3 MG/DL (ref 0–1)
BUN SERPL-MCNC: 9 MG/DL (ref 7–20)
CALCIUM SERPL-MCNC: 10 MG/DL (ref 8.3–10.6)
CHLORIDE SERPL-SCNC: 99 MMOL/L (ref 99–110)
CHOLEST SERPL-MCNC: 204 MG/DL (ref 0–199)
CO2 SERPL-SCNC: 24 MMOL/L (ref 21–32)
CREAT SERPL-MCNC: 1 MG/DL (ref 0.9–1.3)
GFR SERPLBLD CREATININE-BSD FMLA CKD-EPI: >90 ML/MIN/{1.73_M2}
GLUCOSE SERPL-MCNC: 108 MG/DL (ref 70–99)
HDLC SERPL-MCNC: 39 MG/DL (ref 40–60)
LDLC SERPL CALC-MCNC: 116 MG/DL
POTASSIUM SERPL-SCNC: 4.8 MMOL/L (ref 3.5–5.1)
PROT SERPL-MCNC: 8 G/DL (ref 6.4–8.2)
SODIUM SERPL-SCNC: 139 MMOL/L (ref 136–145)
TRIGL SERPL-MCNC: 244 MG/DL (ref 0–150)
TSH SERPL DL<=0.005 MIU/L-ACNC: 1.36 UIU/ML (ref 0.27–4.2)
VLDLC SERPL CALC-MCNC: 49 MG/DL

## 2024-03-25 PROCEDURE — 99396 PREV VISIT EST AGE 40-64: CPT | Performed by: PHYSICIAN ASSISTANT

## 2024-03-25 PROCEDURE — 99214 OFFICE O/P EST MOD 30 MIN: CPT | Performed by: PHYSICIAN ASSISTANT

## 2024-03-25 ASSESSMENT — PATIENT HEALTH QUESTIONNAIRE - PHQ9
10. IF YOU CHECKED OFF ANY PROBLEMS, HOW DIFFICULT HAVE THESE PROBLEMS MADE IT FOR YOU TO DO YOUR WORK, TAKE CARE OF THINGS AT HOME, OR GET ALONG WITH OTHER PEOPLE: NOT DIFFICULT AT ALL
4. FEELING TIRED OR HAVING LITTLE ENERGY: NOT AT ALL
6. FEELING BAD ABOUT YOURSELF - OR THAT YOU ARE A FAILURE OR HAVE LET YOURSELF OR YOUR FAMILY DOWN: NOT AT ALL
9. THOUGHTS THAT YOU WOULD BE BETTER OFF DEAD, OR OF HURTING YOURSELF: NOT AT ALL
SUM OF ALL RESPONSES TO PHQ QUESTIONS 1-9: 0
1. LITTLE INTEREST OR PLEASURE IN DOING THINGS: NOT AT ALL
3. TROUBLE FALLING OR STAYING ASLEEP: NOT AT ALL
SUM OF ALL RESPONSES TO PHQ9 QUESTIONS 1 & 2: 0
SUM OF ALL RESPONSES TO PHQ QUESTIONS 1-9: 0
8. MOVING OR SPEAKING SO SLOWLY THAT OTHER PEOPLE COULD HAVE NOTICED. OR THE OPPOSITE, BEING SO FIGETY OR RESTLESS THAT YOU HAVE BEEN MOVING AROUND A LOT MORE THAN USUAL: NOT AT ALL
5. POOR APPETITE OR OVEREATING: NOT AT ALL
7. TROUBLE CONCENTRATING ON THINGS, SUCH AS READING THE NEWSPAPER OR WATCHING TELEVISION: NOT AT ALL
SUM OF ALL RESPONSES TO PHQ QUESTIONS 1-9: 0
SUM OF ALL RESPONSES TO PHQ QUESTIONS 1-9: 0
2. FEELING DOWN, DEPRESSED OR HOPELESS: NOT AT ALL

## 2024-03-25 NOTE — PATIENT INSTRUCTIONS
Healthy Living Recommendations 2024:  Exercise 150 minutes/ week: Aerobic and Weights Aerobic exercise strengthens muscle while weights and resistance strengthens bone. Body Mass Index (BMI) goal is 25.     Nutrition: Avoid salting foods. Limit caffeine to less than 3 cups caffeine/day. Limit carbohydrates like breads, rice, and pastas. Avoid processed and fried foods. Eat baked or broiled foods. One can never have too many vegetables and fruits which are good fiber source. Fiber lowers glucose levels. Studies show diets high in red meat and processed foods WILL increase the risk heart and liver diseases, cancers, and dementia.   Sugar : Female: Maximum sugar/ day is 6 tsp or 24 grams/ day               Male: Maximum sugar/ day is 9 tsp or 28 grams/ day  Protein:  used to protect immune system, regulate hormones,and build muscle.   High protein foods: [place], Greek yogurt, chicken, Lentils, Eggs  Calcium/ Vit D3 intake helps bones, digestion, kidneys, and mental health.   High calcium foods:  milk, yogurt, cheese, beans, dark green leafy vegetables.   High Vitamin D foods:  salmon, tuna fish, fortified cereals, mushrooms.  Tobacco: Avoid all smoking     Eye exam every 2 years after age 40.   Dental; Brush twice a day. Floss daily. Dentist exam every 6 months.  Noise: recurrent loud noise WILL result in hearing loss.   Earbud use: keep volume below 50%. Just 5 minutes at 100% volume will result in permanent hearing loss.   Colonoscopy Begin at age 45.    Skin: examine skin monthly for changes.  Breasts: Perform monthly self-breast exam for changes. FM: Yearly mammograms begin age 40. Repeat yearly.  Males   perform monthly self-testicular exam for changes.  Urinary changes can be a sign of prostate issues.         [facility]   [address] [facility], [address], [place] [address]  Office hours: Monday - Friday 7 am- 5 pm. Phone lines turn on at 8 am.   Office PH: [phone], FAX [phone]

## 2024-03-25 NOTE — PROGRESS NOTES
[facility]   ANNUAL MEDICAL EXAMINATION      3/25/2024       CC:  Chief Complaint   Patient presents with    Annual Exam     Patient is here for a physical, he is fasting for blood work          HPI: Francisco Ortiz 1980 is a 43 y.o. male presents for a complete annual medical examination.    HYPERLIPIDEMIA- improved diet, more veggies  BMI 37- walks occasionally. Does not monitor diet.   Elevated glucose- mom w DM    PREVENTIVE HEALTH:   Last eye exam:    2017  Hearing concerns: No  Last Dental exam:    Every 6 Months   Caffeine use:  mainly water now.   Exercise: walks daily.looking for a new job  Diet: feels he needs improvement on still needs to cut down on sugars, has a sweet tooth.  Alcohol use: 1 per week.  Tobacco/ Vapping use:  Never  Perform routine skin checks? No  Colonoscopy:  no famhx  Living will: no,   discussed     REVIEW OF SYSTEMS:  Pertinent positive and negatives are in HPI. Remaining reviewed and are unremarkable for other constitutional, EENT, cardiac, pulmonary, GI, , neurologic, musculoskeletal, or integumentary complaints.    PAST MEDICAL/SURGICAL/SOCIAL HISTORY:  Reviewed and updated    ALLERGIES:    Patient has no known allergies.    MEDICATIONS:  No current outpatient medications on file prior to visit.     No current facility-administered medications on file prior to visit.        PHYSICAL EXAM:   Vitals:    03/25/24 0816   BP: 118/70   Pulse: 77   Resp: 18   Temp: 98.2 °F (36.8 °C)   TempSrc: Temporal   SpO2: 96%   Weight: 128.5 kg (283 lb 6.4 oz)   Height: 1.877 m (6' 1.9\")      Body mass index is 36.48 kg/m².  GENERAL APPEARANCE:  Well-nourished.  No distress.    HEENT: Normocephalic. Atraumatic.  EYES: Vision intact.  EOMI, PERRLA. Conjunctivae pink and moist. Sclera white.   Cornea and lens clear.    EARS: Auricles symmetrical without lesions or deformities.  Canals clear without erythema.  TM's intact bilaterally. Hearing  intact.   NOSE: patent and

## 2024-03-26 LAB
EST. AVERAGE GLUCOSE BLD GHB EST-MCNC: 119.8 MG/DL
HBA1C MFR BLD: 5.8 %

## 2024-07-19 NOTE — PROGRESS NOTES
Francisco Ortiz    Age 44 y.o.    male    1980    MRN [id number]    7/26/2024  Arrival Time_____________  OR Time____________30 Min     Procedure(s):  COLONOSCOPY                      Monitor Anesthesia Care    Surgeon(s):  Wade Esquivel, MD       Phone [phone] (home) [phone] (work)    [facility]  Date  Info Source  Home  Cell         Work  _____________________________________________________________________  _____________________________________________________________________  _____________________________________________________________________  _____________________________________________________________________  _____________________________________________________________________    PCP _____________________________ Phone_________________     H&P  ________________  Bringing      Chart              Epic      DOS      Called________  EKG ________________   Bringing      Chart              Epic      DOS      Called________  LABS________________   Bringing     Chart              Epic      DOS      Called________  Cardiac Clearance ______ Bringing      Chart              Epic      DOS      Called________  Pulmonary Clearance____ Bringing      Chart              Epic      DOS      Called________    Cardiologist________________________ Phone___________________________  Pulmonologist_______________________Phone___________________________    ? Advance Directives   ? Judaism concerns / Waiver on Chart            PAT Communications________________  ? Pre-op Instructions Given /Understood          _________________________________  ? Directions to Surgery Center                          _________________________________  ? Transportation Home_______________      __________________________________  ? Crutches/Walker__________________        __________________________________    Orders: Hard copy/ EPIC                 Transcribed/ EPIC              _______Wt.    ________Pharmacy

## 2024-07-22 NOTE — PROGRESS NOTES
Date and time of surgery :  7/26/24 at 1130            Arrival Time:  1030     Bring Picture ID and insurance card.  Please wear simple, loose fitting clothing to the hospital.   Do not bring valuables (money, credit cards, checkbooks, etc.)   DO NOT wear any jewelry or piercings on day of surgery.  All body piercing jewelry must be removed.  If you have dentures, they will be removed before going to the OR; we will provide you a container.  If you wear contact lenses or glasses, they will be removed; please bring a case for them.  Shower the evening before or morning of surgery with antibacterial soap.  Nothing to eat or drink after 630 am the morning of your procedure  You may brush your teeth and gargle the morning of surgery.  DO NOT SWALLOW WATER.   Do not take any morning meds the day of your surgery.  Aspirin, Ibuprofen, Advil, Naproxen, Vitamin E and other Anti-inflammatory products and supplements should be stopped for 5 -7days before surgery or as directed by your physician.  Do not smoke or drink any alcoholic beverages 24 hours prior to surgery.  This includes NA Beer. Refrain from the usage of any recreational drugs, including non-prescribed prescription drugs.   You MUST plan for a responsible adult to stay on site while you are here and take you home after your surgery. You will not be allowed to leave alone or drive yourself home. It is strongly suggested someone stay with you the first 24 hrs. Your surgery will be cancelled if you do not have a ride home.  To help prevent infection, change your sheets the night before surgery.   If you  have a Living Will and Durable Power of  for Healthcare, please bring in a copy.  Notify your Surgeon if you develop any illness between now and time of surgery. Cough, cold, fever, sore throat, nausea, vomiting, etc.  Please notify your surgeon if you experience dizziness, shortness of breath or blurred vision between now & the time of your surgery  To

## 2024-07-24 NOTE — H&P
[facility]   Pre-operative History and Physical    Patient: Francisco Ortiz  : 1980  Acct#:     HISTORY OF PRESENT ILLNESS:    Indications: Colon cancer screening     The patient is a 43-year-old male with medical history of hyperlipidemia, obesity (BMI 36.4) referred for colon cancer screening. Offers no complaints. Denies abdominal pain, dysphagia, odynophagia, nausea, vomiting, fever, chills,melena or hematochezia. No family history of colon cancer or advanced adenomas.    Past Medical History:    History reviewed. No pertinent past medical history.   Past Surgical History:        Procedure Laterality Date    INGUINAL HERNIA REPAIR Left     LUMBAR SPINE SURGERY  1998    for spondylolisthesis - birth defect      Medications Prior to Admission:   No current facility-administered medications on file prior to encounter.     No current outpatient medications on file prior to encounter.        Allergies:  Patient has no known allergies.    Social History:   Social History     Socioeconomic History    Marital status:      Spouse name: Arabella    Number of children: 1    Years of education: Not on file    Highest education level: Not on file   Occupational History    Occupation:    Tobacco Use    Smoking status: Never    Smokeless tobacco: Never   Vaping Use    Vaping Use: Never used   Substance and Sexual Activity    Alcohol use: Yes     Alcohol/week: 0.0 - 1.0 standard drinks of alcohol    Drug use: No    Sexual activity: Yes     Partners: Female   Other Topics Concern    Not on file   Social History Narrative    Not on file     Social Determinants of Health     Financial Resource Strain: Low Risk  (10/30/2023)    Overall Financial Resource Strain (CARDIA)     Difficulty of Paying Living Expenses: Not hard at all   Food Insecurity: Not on file (10/30/2023)   Transportation Needs: Unknown (10/30/2023)    PRAPARE - Transportation     Lack of Transportation (Medical): Not on file

## 2024-07-26 ENCOUNTER — ANESTHESIA (OUTPATIENT)
Dept: ENDOSCOPY | Age: 44
End: 2024-07-26
Payer: COMMERCIAL

## 2024-07-26 ENCOUNTER — HOSPITAL ENCOUNTER (OUTPATIENT)
Age: 44
Setting detail: OUTPATIENT SURGERY
Discharge: HOME | End: 2024-07-26
Attending: INTERNAL MEDICINE | Admitting: INTERNAL MEDICINE
Payer: COMMERCIAL

## 2024-07-26 ENCOUNTER — ANESTHESIA EVENT (OUTPATIENT)
Dept: ENDOSCOPY | Age: 44
End: 2024-07-26
Payer: COMMERCIAL

## 2024-07-26 VITALS
RESPIRATION RATE: 18 BRPM | HEART RATE: 55 BPM | DIASTOLIC BLOOD PRESSURE: 75 MMHG | WEIGHT: 250 LBS | OXYGEN SATURATION: 98 % | HEIGHT: 74 IN | TEMPERATURE: 97.8 F | BODY MASS INDEX: 32.08 KG/M2 | SYSTOLIC BLOOD PRESSURE: 118 MMHG

## 2024-07-26 PROCEDURE — 3700000000 HC ANESTHESIA ATTENDED CARE: Performed by: INTERNAL MEDICINE

## 2024-07-26 PROCEDURE — 7100000010 HC PHASE II RECOVERY - FIRST 15 MIN: Performed by: INTERNAL MEDICINE

## 2024-07-26 PROCEDURE — 7100000011 HC PHASE II RECOVERY - ADDTL 15 MIN: Performed by: INTERNAL MEDICINE

## 2024-07-26 PROCEDURE — 2580000003 HC RX 258: Performed by: NURSE ANESTHETIST, CERTIFIED REGISTERED

## 2024-07-26 PROCEDURE — 2709999900 HC NON-CHARGEABLE SUPPLY: Performed by: INTERNAL MEDICINE

## 2024-07-26 PROCEDURE — 3609027000 HC COLONOSCOPY: Performed by: INTERNAL MEDICINE

## 2024-07-26 PROCEDURE — 2500000003 HC RX 250 WO HCPCS: Performed by: NURSE ANESTHETIST, CERTIFIED REGISTERED

## 2024-07-26 PROCEDURE — 3700000001 HC ADD 15 MINUTES (ANESTHESIA): Performed by: INTERNAL MEDICINE

## 2024-07-26 PROCEDURE — 6360000002 HC RX W HCPCS: Performed by: NURSE ANESTHETIST, CERTIFIED REGISTERED

## 2024-07-26 RX ORDER — SODIUM CHLORIDE 0.9 % (FLUSH) 0.9 %
5-40 SYRINGE (ML) INJECTION EVERY 12 HOURS SCHEDULED
Status: DISCONTINUED | OUTPATIENT
Start: 2024-07-26 | End: 2024-07-26 | Stop reason: HOSPADM

## 2024-07-26 RX ORDER — SODIUM CHLORIDE, SODIUM LACTATE, POTASSIUM CHLORIDE, CALCIUM CHLORIDE 600; 310; 30; 20 MG/100ML; MG/100ML; MG/100ML; MG/100ML
INJECTION, SOLUTION INTRAVENOUS CONTINUOUS PRN
Status: DISCONTINUED | OUTPATIENT
Start: 2024-07-26 | End: 2024-07-26 | Stop reason: SDUPTHER

## 2024-07-26 RX ORDER — PROPOFOL 10 MG/ML
INJECTION, EMULSION INTRAVENOUS CONTINUOUS PRN
Status: DISCONTINUED | OUTPATIENT
Start: 2024-07-26 | End: 2024-07-26 | Stop reason: SDUPTHER

## 2024-07-26 RX ORDER — LIDOCAINE HYDROCHLORIDE 20 MG/ML
INJECTION, SOLUTION INFILTRATION; PERINEURAL PRN
Status: DISCONTINUED | OUTPATIENT
Start: 2024-07-26 | End: 2024-07-26 | Stop reason: SDUPTHER

## 2024-07-26 RX ORDER — SODIUM CHLORIDE 0.9 % (FLUSH) 0.9 %
5-40 SYRINGE (ML) INJECTION PRN
Status: DISCONTINUED | OUTPATIENT
Start: 2024-07-26 | End: 2024-07-26 | Stop reason: HOSPADM

## 2024-07-26 RX ORDER — PROPOFOL 10 MG/ML
INJECTION, EMULSION INTRAVENOUS PRN
Status: DISCONTINUED | OUTPATIENT
Start: 2024-07-26 | End: 2024-07-26 | Stop reason: SDUPTHER

## 2024-07-26 RX ORDER — LIDOCAINE HYDROCHLORIDE 10 MG/ML
1 INJECTION, SOLUTION EPIDURAL; INFILTRATION; INTRACAUDAL; PERINEURAL
Status: DISCONTINUED | OUTPATIENT
Start: 2024-07-26 | End: 2024-07-26 | Stop reason: HOSPADM

## 2024-07-26 RX ORDER — SODIUM CHLORIDE 9 MG/ML
INJECTION, SOLUTION INTRAVENOUS PRN
Status: DISCONTINUED | OUTPATIENT
Start: 2024-07-26 | End: 2024-07-26 | Stop reason: HOSPADM

## 2024-07-26 RX ORDER — SODIUM CHLORIDE, SODIUM LACTATE, POTASSIUM CHLORIDE, CALCIUM CHLORIDE 600; 310; 30; 20 MG/100ML; MG/100ML; MG/100ML; MG/100ML
INJECTION, SOLUTION INTRAVENOUS CONTINUOUS
Status: DISCONTINUED | OUTPATIENT
Start: 2024-07-26 | End: 2024-07-26 | Stop reason: HOSPADM

## 2024-07-26 RX ADMIN — PROPOFOL 20 MG: 10 INJECTION, EMULSION INTRAVENOUS at 12:11

## 2024-07-26 RX ADMIN — PROPOFOL 80 MG: 10 INJECTION, EMULSION INTRAVENOUS at 12:09

## 2024-07-26 RX ADMIN — PROPOFOL 150 MCG/KG/MIN: 10 INJECTION, EMULSION INTRAVENOUS at 12:11

## 2024-07-26 RX ADMIN — LIDOCAINE HYDROCHLORIDE 60 MG: 20 INJECTION, SOLUTION INFILTRATION; PERINEURAL at 12:09

## 2024-07-26 RX ADMIN — SODIUM CHLORIDE, SODIUM LACTATE, POTASSIUM CHLORIDE, AND CALCIUM CHLORIDE: .6; .31; .03; .02 INJECTION, SOLUTION INTRAVENOUS at 12:06

## 2024-07-26 ASSESSMENT — ENCOUNTER SYMPTOMS: SHORTNESS OF BREATH: 0

## 2024-07-26 NOTE — PROGRESS NOTES
Anesthesia and endo report received.  Pt arrived with oral airway.  Pt awakens to verbal stimuli and oral airway pulled out

## 2024-07-26 NOTE — ANESTHESIA PRE PROCEDURE
03/25/24 118/70   10/30/23 112/70       NPO Status: Time of last liquid consumption: 0600                        Time of last solid consumption: 1800                        Date of last liquid consumption: 07/26/24                        Date of last solid food consumption: 07/24/24    BMI:   Wt Readings from Last 3 Encounters:   07/22/24 113.4 kg (250 lb)   03/25/24 128.5 kg (283 lb 6.4 oz)   10/30/23 134.2 kg (295 lb 12.8 oz)     Body mass index is 32.1 kg/m².    CBC:   Lab Results   Component Value Date/Time    WBC 7.1 03/29/2019 12:20 PM    RBC 5.49 03/29/2019 12:20 PM    HGB 16.2 03/29/2019 12:20 PM    HCT 47.8 03/29/2019 12:20 PM    MCV 86.9 03/29/2019 12:20 PM    RDW 12.9 03/29/2019 12:20 PM     03/29/2019 12:20 PM       CMP:   Lab Results   Component Value Date/Time     03/25/2024 08:47 AM    K 4.8 03/25/2024 08:47 AM    CL 99 03/25/2024 08:47 AM    CO2 24 03/25/2024 08:47 AM    BUN 9 03/25/2024 08:47 AM    CREATININE 1.0 03/25/2024 08:47 AM    GFRAA >60 03/17/2022 08:21 AM    GFRAA >60 09/24/2010 10:52 AM    AGRATIO 1.5 03/25/2024 08:47 AM    LABGLOM >90 03/25/2024 08:47 AM    GLUCOSE 108 03/25/2024 08:47 AM    CALCIUM 10.0 03/25/2024 08:47 AM    BILITOT 0.3 03/25/2024 08:47 AM    ALKPHOS 114 03/25/2024 08:47 AM    AST 18 03/25/2024 08:47 AM    ALT 34 03/25/2024 08:47 AM       POC Tests: No results for input(s): \"POCGLU\", \"POCNA\", \"POCK\", \"POCCL\", \"POCBUN\", \"POCHEMO\", \"POCHCT\" in the last 72 hours.    Coags: No results found for: \"PROTIME\", \"INR\", \"APTT\"    HCG (If Applicable): No results found for: \"PREGTESTUR\", \"PREGSERUM\", \"HCG\", \"HCGQUANT\"     ABGs: No results found for: \"PHART\", \"PO2ART\", \"IOE9TTJ\", \"AIB5TMN\", \"BEART\", \"L4JAQVYK\"     Type & Screen (If Applicable):  No results found for: \"LABABO\"    Drug/Infectious Status (If Applicable):  No results found for: \"HIV\", \"HEPCAB\"    COVID-19 Screening (If Applicable):   Lab Results   Component Value Date/Time    COVID19 DETECTED 02/12/2021

## 2024-07-26 NOTE — DISCHARGE INSTRUCTIONS
under license by [employer]. If you have questions about a medical condition or this instruction, always ask your healthcare professional. [employer] disclaims any warranty or liability for your use of this information.

## 2024-07-26 NOTE — PROGRESS NOTES
Patient admitted to preop bay 12. Consents verified. Patient NPO since 0600. Patient belongings to remain on PACU cart for procedure.

## 2024-10-01 PROBLEM — E78.5 HYPERLIPIDEMIA: Status: ACTIVE | Noted: 2024-10-01

## 2024-10-01 PROBLEM — R73.09 ELEVATED GLUCOSE: Status: ACTIVE | Noted: 2024-10-01

## 2024-10-01 PROBLEM — E66.01 SEVERE OBESITY (BMI 35.0-39.9) WITH COMORBIDITY: Status: ACTIVE | Noted: 2024-10-01

## 2024-10-10 NOTE — PROGRESS NOTES
[facility]  10/14/24       IMPRESSION/ PLAN  1. Other hyperlipidemia    2. Severe obesity (BMI 35.0-39.9) with comorbidity    3. Prediabetes      Hyperlipidemia, unspecified hyperlipidemia type  weight loss #20 since last visit. Check labs and adjust treatment as needed.     Severe obesity    -  BMI from 37 to 35 due to daily work exercise and eating healthier. Examples of healthier foods discussed.      Prediabetes   -continue to monitor.   Hemoglobin A1C (%)   Date Value   10/14/2024 6.0   03/25/2024 5.8      HEALTH MAINTENANCE:  Defers flu vaccine    Routine follow up 6 mo        CHIEF COMPLAINT  Chief Complaint   Patient presents with    Hyperlipidemia     Pt is here for 6 month f/u     HISTORY OF PRESENT  ILLNESS  Francisco Ortiz is a 44 y.o.  male   6 month routine follow up.     HYPERLIPIDEMIA- improved diet, more veggies. 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   BMI 37- walks    Elevated glucose- mom w DM    ROS:  Remaining 14 ROS were reviewed and are unremarkable for other constitutional, EENT, cardiac, pulmonary, GI, , neurologic, musculoskeletal, or integumentary complaints.      PAST MEDICAL/SURGICAL, SOCIAL, &  FAMILY HISTORY:  Reviewed and updated accordingly.     ALLERGIES : Patient has no known allergies.    MEDICATIONS:  No current outpatient medications on file prior to visit.     No current facility-administered medications on file prior to visit.        PHYSICAL EXAM     Vitals:    10/14/24 1543 10/14/24 1546   BP: 138/74 128/68   Pulse: 68    Resp: 18    Temp: 98.2 °F (36.8 °C)    TempSrc: Temporal    SpO2: 99%    Weight: 124.5 kg (274 lb 6.4 oz)    Height: 1.88 m (6' 2\")    Body mass index is 35.23 kg/m².  APPEARANCE: Well nourished. No distress.   HEENT: Head: Normocephalic, atraumatic.  EOMI,PERRLA. Conjunctiva pink and moist. Sclera white. Hearing intact. Nares patent. Oral mucosa moist. Throat clear.  NECK: No lymphadenopathy or

## 2024-10-14 ENCOUNTER — OFFICE VISIT (OUTPATIENT)
Dept: FAMILY MEDICINE CLINIC | Age: 44
End: 2024-10-14
Payer: COMMERCIAL

## 2024-10-14 VITALS
OXYGEN SATURATION: 99 % | BODY MASS INDEX: 35.22 KG/M2 | TEMPERATURE: 98.2 F | HEIGHT: 74 IN | DIASTOLIC BLOOD PRESSURE: 68 MMHG | WEIGHT: 274.4 LBS | SYSTOLIC BLOOD PRESSURE: 128 MMHG | HEART RATE: 68 BPM | RESPIRATION RATE: 18 BRPM

## 2024-10-14 DIAGNOSIS — E66.01 SEVERE OBESITY (BMI 35.0-39.9) WITH COMORBIDITY: ICD-10-CM

## 2024-10-14 DIAGNOSIS — E78.49 OTHER HYPERLIPIDEMIA: Primary | ICD-10-CM

## 2024-10-14 DIAGNOSIS — R73.03 PREDIABETES: ICD-10-CM

## 2024-10-14 LAB — HBA1C MFR BLD: 6 %

## 2024-10-14 PROCEDURE — 99214 OFFICE O/P EST MOD 30 MIN: CPT | Performed by: PHYSICIAN ASSISTANT

## 2024-10-14 PROCEDURE — G2211 COMPLEX E/M VISIT ADD ON: HCPCS | Performed by: PHYSICIAN ASSISTANT

## 2024-10-14 PROCEDURE — 83036 HEMOGLOBIN GLYCOSYLATED A1C: CPT | Performed by: PHYSICIAN ASSISTANT

## 2024-10-14 ASSESSMENT — PATIENT HEALTH QUESTIONNAIRE - PHQ9
SUM OF ALL RESPONSES TO PHQ QUESTIONS 1-9: 0
10. IF YOU CHECKED OFF ANY PROBLEMS, HOW DIFFICULT HAVE THESE PROBLEMS MADE IT FOR YOU TO DO YOUR WORK, TAKE CARE OF THINGS AT HOME, OR GET ALONG WITH OTHER PEOPLE: NOT DIFFICULT AT ALL
9. THOUGHTS THAT YOU WOULD BE BETTER OFF DEAD, OR OF HURTING YOURSELF: NOT AT ALL
2. FEELING DOWN, DEPRESSED OR HOPELESS: NOT AT ALL
4. FEELING TIRED OR HAVING LITTLE ENERGY: NOT AT ALL
5. POOR APPETITE OR OVEREATING: NOT AT ALL
6. FEELING BAD ABOUT YOURSELF - OR THAT YOU ARE A FAILURE OR HAVE LET YOURSELF OR YOUR FAMILY DOWN: NOT AT ALL
SUM OF ALL RESPONSES TO PHQ9 QUESTIONS 1 & 2: 0
7. TROUBLE CONCENTRATING ON THINGS, SUCH AS READING THE NEWSPAPER OR WATCHING TELEVISION: NOT AT ALL
SUM OF ALL RESPONSES TO PHQ QUESTIONS 1-9: 0
SUM OF ALL RESPONSES TO PHQ QUESTIONS 1-9: 0
1. LITTLE INTEREST OR PLEASURE IN DOING THINGS: NOT AT ALL
SUM OF ALL RESPONSES TO PHQ QUESTIONS 1-9: 0
3. TROUBLE FALLING OR STAYING ASLEEP: NOT AT ALL
8. MOVING OR SPEAKING SO SLOWLY THAT OTHER PEOPLE COULD HAVE NOTICED. OR THE OPPOSITE, BEING SO FIGETY OR RESTLESS THAT YOU HAVE BEEN MOVING AROUND A LOT MORE THAN USUAL: NOT AT ALL

## 2024-10-15 LAB
ALBUMIN SERPL-MCNC: 4.4 G/DL (ref 3.4–5)
ALBUMIN/GLOB SERPL: 1.6 {RATIO} (ref 1.1–2.2)
ALP SERPL-CCNC: 100 U/L (ref 40–129)
ALT SERPL-CCNC: 26 U/L (ref 10–40)
ANION GAP SERPL CALCULATED.3IONS-SCNC: 12 MMOL/L (ref 3–16)
AST SERPL-CCNC: 17 U/L (ref 15–37)
BILIRUB SERPL-MCNC: 0.3 MG/DL (ref 0–1)
BUN SERPL-MCNC: 8 MG/DL (ref 7–20)
CALCIUM SERPL-MCNC: 9.5 MG/DL (ref 8.3–10.6)
CHLORIDE SERPL-SCNC: 105 MMOL/L (ref 99–110)
CHOLEST SERPL-MCNC: 179 MG/DL (ref 0–199)
CO2 SERPL-SCNC: 23 MMOL/L (ref 21–32)
CREAT SERPL-MCNC: 0.8 MG/DL (ref 0.9–1.3)
GFR SERPLBLD CREATININE-BSD FMLA CKD-EPI: >90 ML/MIN/{1.73_M2}
GLUCOSE SERPL-MCNC: 92 MG/DL (ref 70–99)
HDLC SERPL-MCNC: 35 MG/DL (ref 40–60)
LDLC SERPL CALC-MCNC: 96 MG/DL
POTASSIUM SERPL-SCNC: 4.3 MMOL/L (ref 3.5–5.1)
PROT SERPL-MCNC: 7.2 G/DL (ref 6.4–8.2)
SODIUM SERPL-SCNC: 140 MMOL/L (ref 136–145)
TRIGL SERPL-MCNC: 241 MG/DL (ref 0–150)
VLDLC SERPL CALC-MCNC: 48 MG/DL

## 2024-12-18 ENCOUNTER — OFFICE VISIT (OUTPATIENT)
Dept: FAMILY MEDICINE CLINIC | Age: 44
End: 2024-12-18

## 2024-12-18 VITALS
TEMPERATURE: 98.2 F | DIASTOLIC BLOOD PRESSURE: 66 MMHG | HEART RATE: 88 BPM | HEIGHT: 74 IN | RESPIRATION RATE: 16 BRPM | OXYGEN SATURATION: 97 % | BODY MASS INDEX: 34.37 KG/M2 | SYSTOLIC BLOOD PRESSURE: 132 MMHG | WEIGHT: 267.8 LBS

## 2024-12-18 DIAGNOSIS — F41.8 SITUATIONAL ANXIETY: Primary | ICD-10-CM

## 2024-12-18 RX ORDER — HYDROXYZINE PAMOATE 25 MG/1
25 CAPSULE ORAL 2 TIMES DAILY PRN
Qty: 20 CAPSULE | Refills: 0 | Status: SHIPPED | OUTPATIENT
Start: 2024-12-18 | End: 2025-01-01

## 2024-12-18 SDOH — ECONOMIC STABILITY: FOOD INSECURITY: WITHIN THE PAST 12 MONTHS, THE FOOD YOU BOUGHT JUST DIDN'T LAST AND YOU DIDN'T HAVE MONEY TO GET MORE.: NEVER TRUE

## 2024-12-18 SDOH — ECONOMIC STABILITY: INCOME INSECURITY: HOW HARD IS IT FOR YOU TO PAY FOR THE VERY BASICS LIKE FOOD, HOUSING, MEDICAL CARE, AND HEATING?: NOT HARD AT ALL

## 2024-12-18 SDOH — ECONOMIC STABILITY: FOOD INSECURITY: WITHIN THE PAST 12 MONTHS, YOU WORRIED THAT YOUR FOOD WOULD RUN OUT BEFORE YOU GOT MONEY TO BUY MORE.: NEVER TRUE

## 2024-12-18 ASSESSMENT — PATIENT HEALTH QUESTIONNAIRE - PHQ9
SUM OF ALL RESPONSES TO PHQ QUESTIONS 1-9: 2
5. POOR APPETITE OR OVEREATING: NOT AT ALL
4. FEELING TIRED OR HAVING LITTLE ENERGY: NOT AT ALL
1. LITTLE INTEREST OR PLEASURE IN DOING THINGS: NOT AT ALL
SUM OF ALL RESPONSES TO PHQ QUESTIONS 1-9: 2
7. TROUBLE CONCENTRATING ON THINGS, SUCH AS READING THE NEWSPAPER OR WATCHING TELEVISION: NOT AT ALL
9. THOUGHTS THAT YOU WOULD BE BETTER OFF DEAD, OR OF HURTING YOURSELF: NOT AT ALL
SUM OF ALL RESPONSES TO PHQ9 QUESTIONS 1 & 2: 1
SUM OF ALL RESPONSES TO PHQ QUESTIONS 1-9: 2
8. MOVING OR SPEAKING SO SLOWLY THAT OTHER PEOPLE COULD HAVE NOTICED. OR THE OPPOSITE, BEING SO FIGETY OR RESTLESS THAT YOU HAVE BEEN MOVING AROUND A LOT MORE THAN USUAL: NOT AT ALL
3. TROUBLE FALLING OR STAYING ASLEEP: NOT AT ALL
6. FEELING BAD ABOUT YOURSELF - OR THAT YOU ARE A FAILURE OR HAVE LET YOURSELF OR YOUR FAMILY DOWN: SEVERAL DAYS
10. IF YOU CHECKED OFF ANY PROBLEMS, HOW DIFFICULT HAVE THESE PROBLEMS MADE IT FOR YOU TO DO YOUR WORK, TAKE CARE OF THINGS AT HOME, OR GET ALONG WITH OTHER PEOPLE: SOMEWHAT DIFFICULT
2. FEELING DOWN, DEPRESSED OR HOPELESS: SEVERAL DAYS
SUM OF ALL RESPONSES TO PHQ QUESTIONS 1-9: 2

## 2024-12-18 NOTE — PROGRESS NOTES
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  Has discussed with boss about change.  In meantime referred to therapist for behavior modification and techniques.  Start  hydrOXYzine pamoate (VISTARIL) 25 MG  2 times daily as needed for anxiety  Follow Up 3 mo or sooner if needed.    CHIEF COMPLAINT  Chief Complaint   Patient presents with    Anxiety     Anxiety and depression x several months     HISTORY OF PRESENT  ILLNESS  Francisco Ortiz is a 44 y.o.  male   feeling down lately and more anxious. 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  Doing same job every day and bored.  Anxious ness \" comes out of now where\". Can be sitting still.  Began during the summer.  No etoh use. Likes going to [employer] game.     Depression screening  Feeling depressed?  Yes  Sleep changes?  No  Interests decreased?  Yes  Guilt or worthlessness? No  Energy decrease?  Yes  Concentration difficulty?  No  Appetite changes?  No  Psychomotor agitation or decrease affect?  Yes  Suicidal ideations or intentions?   No  YES total   4 /9 ( +5/9 over 2 weeks is diagnostic for Major Depression)      ROS:  Remaining reviewed and are unremarkable for other constitutional, EENT, cardiac, pulmonary, GI, , neurologic, musculoskeletal, or integumentary complaints.      PAST MEDICAL/SURGICAL, SOCIAL, &  FAMILY HISTORY:  Reviewed and updated accordingly.   ALLERGIES : Patient has no known allergies.  MEDICATIONS:  none    PHYSICAL EXAM     Vitals:    12/18/24 1539   BP: 132/66   Pulse: 88   Resp: 16   Temp: 98.2 °F (36.8 °C)   TempSrc: Temporal   SpO2: 97%   Weight: 121.5 kg (267 lb 12.8 oz)   Height: 1.88 m (6' 2\")   Body mass index is 34.38 kg/m².  APPEARANCE: Well nourished. No distress.   HEENT: Head: Normocephalic, atraumatic.

## 2025-01-23 ENCOUNTER — TELEMEDICINE (OUTPATIENT)
Dept: FAMILY MEDICINE CLINIC | Age: 45
End: 2025-01-23
Payer: COMMERCIAL

## 2025-01-23 DIAGNOSIS — J06.9 VIRAL URI: Primary | ICD-10-CM

## 2025-01-23 PROCEDURE — 99213 OFFICE O/P EST LOW 20 MIN: CPT | Performed by: PHYSICIAN ASSISTANT

## 2025-01-23 RX ORDER — HYDROXYZINE PAMOATE 25 MG/1
25 CAPSULE ORAL 3 TIMES DAILY PRN
COMMUNITY

## 2025-01-23 SDOH — ECONOMIC STABILITY: FOOD INSECURITY: WITHIN THE PAST 12 MONTHS, THE FOOD YOU BOUGHT JUST DIDN'T LAST AND YOU DIDN'T HAVE MONEY TO GET MORE.: NEVER TRUE

## 2025-01-23 SDOH — ECONOMIC STABILITY: FOOD INSECURITY: WITHIN THE PAST 12 MONTHS, YOU WORRIED THAT YOUR FOOD WOULD RUN OUT BEFORE YOU GOT MONEY TO BUY MORE.: NEVER TRUE

## 2025-01-23 ASSESSMENT — PATIENT HEALTH QUESTIONNAIRE - PHQ9
7. TROUBLE CONCENTRATING ON THINGS, SUCH AS READING THE NEWSPAPER OR WATCHING TELEVISION: NOT AT ALL
9. THOUGHTS THAT YOU WOULD BE BETTER OFF DEAD, OR OF HURTING YOURSELF: NOT AT ALL
SUM OF ALL RESPONSES TO PHQ QUESTIONS 1-9: 0
SUM OF ALL RESPONSES TO PHQ QUESTIONS 1-9: 0
5. POOR APPETITE OR OVEREATING: NOT AT ALL
SUM OF ALL RESPONSES TO PHQ9 QUESTIONS 1 & 2: 0
SUM OF ALL RESPONSES TO PHQ QUESTIONS 1-9: 0
1. LITTLE INTEREST OR PLEASURE IN DOING THINGS: NOT AT ALL
SUM OF ALL RESPONSES TO PHQ QUESTIONS 1-9: 0
4. FEELING TIRED OR HAVING LITTLE ENERGY: NOT AT ALL
3. TROUBLE FALLING OR STAYING ASLEEP: NOT AT ALL
6. FEELING BAD ABOUT YOURSELF - OR THAT YOU ARE A FAILURE OR HAVE LET YOURSELF OR YOUR FAMILY DOWN: NOT AT ALL
8. MOVING OR SPEAKING SO SLOWLY THAT OTHER PEOPLE COULD HAVE NOTICED. OR THE OPPOSITE, BEING SO FIGETY OR RESTLESS THAT YOU HAVE BEEN MOVING AROUND A LOT MORE THAN USUAL: NOT AT ALL
2. FEELING DOWN, DEPRESSED OR HOPELESS: NOT AT ALL

## 2025-01-23 NOTE — PROGRESS NOTES
[place]    24     TELEHEALTH EVALUATION -- Audio/Visual (During COVID-19 public health emergency)    2025    HPI:  Chief Complaint   Patient presents with    Fever     Fever, cough, loose stools x 3 days. Daughter has flu       Francisco Ortiz (:  1980) has requested an audio/video evaluation for the following concern(s):      Began 4 days ago  began with dry cough, Fever Tmax 104F,chills, diarrhea.  Feeling better today.  Remedies  NSAID'S, symptom relief meds over the counter.    Daughter dx\"d flu 6 days ago.     ROS: Denies loss of taste or smell, any other symptoms .       Prior to Visit Medications    Medication Sig Taking? Authorizing Provider   hydrOXYzine pamoate (VISTARIL) 25 MG capsule Take 1 capsule by mouth 3 times daily as needed for Anxiety Yes Provider, Jayne, MD     No Known Allergies  MED-SURG- SOC  HISTORY- in chart and reviewed.     PHYSICAL EXAMINATION:        2025     2:18 PM   Patient-Reported Vitals   Patient-Reported Weight 245   Patient-Reported Height 6’2”         Constitutional: [x] Appears well-developed and well-nourished [x] No apparent distress  [] Abnormal- Looks sick, non toxic    Mental status:[x] Alert and awake  [x] Oriented to person/place/time [x]Able to follow commands    Eyes: EOMI,   Sclera  [x]  Normal   Discharge [x]  None visible    HENT:   [x] Normocephalic, atraumatic.  [] Abnormal   [x] Mouth/Throat: Mucous membranes are moist.   External Ears [x] Normal    Neck: [x] No visualized mass   Pulmonary/Chest: [x] Respiratory effort normal.  [x] No visualized signs of difficulty breathing or respiratory distress    [] Abnormal-   Musculoskeletal:   [] Normal gait with no signs of ataxia         [x] Normal range of motion of neck     Neurological:        [x] No Facial Asymmetry (Cranial nerve 7 motor function) (limited exam to video visit)   [x] No gaze palsy        Skin:        [x] No significant exanthematous lesions or

## 2025-01-30 NOTE — PROGRESS NOTES
Behavioral Health Consultation  MIMI Prather  2/4/2025   1:48 PM EST      Time spent with Patient: 30 minutes  This is patient's first [facility] appointment.    Reason for Consult:    Chief Complaint   Patient presents with    Anxiety    Depression     Referring Provider: Iliana Mc PA  [address],  [address]  [place] [address]    Patient provided informed consent for the behavioral health program. Discussed with patient model of service to include the limits of confidentiality (i.e. abuse reporting, suicide intervention, etc.) and short-term intervention focused approach. Pt indicated understanding. Feedback given to PCP.    S:  Patient presents with concerns about anxiety and depression. Patient reports symptoms of anxiety, including excessive worry, irritability, restlessness, and \"feelings of dread\" and worry about the future . Patient also reports shakiness. 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 He is used to working outside and being inside all day has been difficult. Pt feels isolated at work, as he doesn't have much social interaction. He is used to working with others. Patient reported depressive symptoms including depressed mood, low motivation, and anhedonia. Pt reports varied frequency. 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 He is looking forward to the change. Symptoms have been present for one year, upon start of new job. Patient finds relief from being outside. Goals for treatment include \"get back to feeling like the way I used to.\"    Patient lives with wife and 2 daughters (6 and 3 yo). Patient works in a factory. Occasional caffeine use. Denies nicotine use, Rare alcohol use, denies marijuana use, denies  illegal drug use. There is walking as regular exercise. Patient describes normal sleep pattern. Patient enjoys the following hobbies: going to [email or web address] and [employer] games. Patient describes wife as

## 2025-02-04 ENCOUNTER — OFFICE VISIT (OUTPATIENT)
Dept: PSYCHOLOGY | Age: 45
End: 2025-02-04
Payer: COMMERCIAL

## 2025-02-04 DIAGNOSIS — F41.9 ANXIETY: Primary | ICD-10-CM

## 2025-02-04 PROCEDURE — 90791 PSYCH DIAGNOSTIC EVALUATION: CPT | Performed by: SOCIAL WORKER

## 2025-02-04 NOTE — PATIENT INSTRUCTIONS
Take 2-3 diaphragmatic breaths every hour for the next several days, then as needed for anxiety/irritability.  Review handout on grief.      Relaxation:  Diaphragmatic Breathing             ______________________________________________________________________________    1.  Sit in a comfortable position    2.  Place one hand on your stomach and the other on your chest    3.  Try to breathe so that only your stomach rises and falls    As you inhale, concentrate on your chest remaining relatively still while your stomach rises.  It may be helpful for you to imagine that your pants are too big and you need to push your stomach out to hold them up.  When exhaling, allow your stomach to fall in and the air to fully escape.    Inhale slowly.  You may choose to hold the air in for about a second.  Exhale slowly.  Don’t push the air out, but just let the natural pressure of your body slowly move it out.    It is normal for this healthy method of breathing to feel a little awkward at first.  With practice, it will feel more natural.    Get your mind on your side    One other important factor in getting relaxed is your mind.  Your mind and body are connected.  The mind influences the body and the body influences the mind.  What you do with your mind when you are trying to relax is very important.  The key is to avoid thinking about stressful things.      You can think about…      Neutral things (e.g., counting, saying a word like “calm” or “relax”)   Pleasant things (e.g., imagining a pleasant place)    It is recommended that you practice 2 times per day, 10 minutes each time.         Review handout on grief.    Grief & Mourning    Grief is the personal response to loss.  Mourning is the public expression of that loss.         What is “Normal” Grief?  Grief reactions vary depending on who we are, what we lost,  and how much the loss affects our day-to-day functioning.  Different people may express grief differently and you

## 2025-03-19 ENCOUNTER — OFFICE VISIT (OUTPATIENT)
Dept: FAMILY MEDICINE CLINIC | Age: 45
End: 2025-03-19
Payer: COMMERCIAL

## 2025-03-19 VITALS
HEART RATE: 86 BPM | TEMPERATURE: 98.1 F | BODY MASS INDEX: 34.91 KG/M2 | WEIGHT: 272 LBS | SYSTOLIC BLOOD PRESSURE: 134 MMHG | DIASTOLIC BLOOD PRESSURE: 62 MMHG | HEIGHT: 74 IN | OXYGEN SATURATION: 97 %

## 2025-03-19 DIAGNOSIS — E78.49 OTHER HYPERLIPIDEMIA: ICD-10-CM

## 2025-03-19 DIAGNOSIS — E66.01 SEVERE OBESITY (BMI 35.0-39.9) WITH COMORBIDITY: ICD-10-CM

## 2025-03-19 DIAGNOSIS — R73.03 PREDIABETES: ICD-10-CM

## 2025-03-19 DIAGNOSIS — F41.8 SITUATIONAL ANXIETY: ICD-10-CM

## 2025-03-19 DIAGNOSIS — R06.83 SNORES: Primary | ICD-10-CM

## 2025-03-19 PROCEDURE — G2211 COMPLEX E/M VISIT ADD ON: HCPCS | Performed by: PHYSICIAN ASSISTANT

## 2025-03-19 PROCEDURE — 99214 OFFICE O/P EST MOD 30 MIN: CPT | Performed by: PHYSICIAN ASSISTANT

## 2025-03-19 ASSESSMENT — PATIENT HEALTH QUESTIONNAIRE - PHQ9
4. FEELING TIRED OR HAVING LITTLE ENERGY: NOT AT ALL
SUM OF ALL RESPONSES TO PHQ QUESTIONS 1-9: 0
7. TROUBLE CONCENTRATING ON THINGS, SUCH AS READING THE NEWSPAPER OR WATCHING TELEVISION: NOT AT ALL
9. THOUGHTS THAT YOU WOULD BE BETTER OFF DEAD, OR OF HURTING YOURSELF: NOT AT ALL
8. MOVING OR SPEAKING SO SLOWLY THAT OTHER PEOPLE COULD HAVE NOTICED. OR THE OPPOSITE, BEING SO FIGETY OR RESTLESS THAT YOU HAVE BEEN MOVING AROUND A LOT MORE THAN USUAL: NOT AT ALL
5. POOR APPETITE OR OVEREATING: NOT AT ALL
SUM OF ALL RESPONSES TO PHQ QUESTIONS 1-9: 0
SUM OF ALL RESPONSES TO PHQ QUESTIONS 1-9: 0
6. FEELING BAD ABOUT YOURSELF - OR THAT YOU ARE A FAILURE OR HAVE LET YOURSELF OR YOUR FAMILY DOWN: NOT AT ALL
3. TROUBLE FALLING OR STAYING ASLEEP: NOT AT ALL
2. FEELING DOWN, DEPRESSED OR HOPELESS: NOT AT ALL
1. LITTLE INTEREST OR PLEASURE IN DOING THINGS: NOT AT ALL
10. IF YOU CHECKED OFF ANY PROBLEMS, HOW DIFFICULT HAVE THESE PROBLEMS MADE IT FOR YOU TO DO YOUR WORK, TAKE CARE OF THINGS AT HOME, OR GET ALONG WITH OTHER PEOPLE: NOT DIFFICULT AT ALL
SUM OF ALL RESPONSES TO PHQ QUESTIONS 1-9: 0

## 2025-03-20 ENCOUNTER — TELEPHONE (OUTPATIENT)
Dept: PULMONOLOGY | Age: 45
End: 2025-03-20

## 2025-03-20 NOTE — TELEPHONE ENCOUNTER
Recevied referral from Iliana Mc for Snoring, first available with any Sleep Provider.    3.20.25 - Attempted to contact patient to schedule left voicemail to return call back to office.

## 2025-05-21 ENCOUNTER — OFFICE VISIT (OUTPATIENT)
Dept: PULMONOLOGY | Age: 45
End: 2025-05-21
Payer: COMMERCIAL

## 2025-05-21 VITALS
TEMPERATURE: 98.3 F | WEIGHT: 275 LBS | BODY MASS INDEX: 35.29 KG/M2 | OXYGEN SATURATION: 96 % | HEIGHT: 74 IN | HEART RATE: 85 BPM | RESPIRATION RATE: 16 BRPM | DIASTOLIC BLOOD PRESSURE: 85 MMHG | SYSTOLIC BLOOD PRESSURE: 125 MMHG

## 2025-05-21 DIAGNOSIS — G47.30 SLEEP APNEA, UNSPECIFIED TYPE: Primary | ICD-10-CM

## 2025-05-21 PROCEDURE — 99203 OFFICE O/P NEW LOW 30 MIN: CPT | Performed by: INTERNAL MEDICINE

## 2025-05-21 ASSESSMENT — SLEEP AND FATIGUE QUESTIONNAIRES
HOW LIKELY ARE YOU TO NOD OFF OR FALL ASLEEP IN A CAR, WHILE STOPPED FOR A FEW MINUTES IN TRAFFIC: WOULD NEVER DOZE
HOW LIKELY ARE YOU TO NOD OFF OR FALL ASLEEP WHEN YOU ARE A PASSENGER IN A CAR FOR AN HOUR WITHOUT A BREAK: MODERATE CHANCE OF DOZING
HOW LIKELY ARE YOU TO NOD OFF OR FALL ASLEEP WHILE SITTING QUIETLY AFTER LUNCH WITHOUT ALCOHOL: WOULD NEVER DOZE
HOW LIKELY ARE YOU TO NOD OFF OR FALL ASLEEP WHILE WATCHING TV: WOULD NEVER DOZE
HOW LIKELY ARE YOU TO NOD OFF OR FALL ASLEEP WHILE LYING DOWN TO REST IN THE AFTERNOON WHEN CIRCUMSTANCES PERMIT: MODERATE CHANCE OF DOZING
HOW LIKELY ARE YOU TO NOD OFF OR FALL ASLEEP WHILE SITTING AND TALKING TO SOMEONE: WOULD NEVER DOZE
ESS TOTAL SCORE: 6
HOW LIKELY ARE YOU TO NOD OFF OR FALL ASLEEP WHILE SITTING INACTIVE IN A PUBLIC PLACE: WOULD NEVER DOZE
HOW LIKELY ARE YOU TO NOD OFF OR FALL ASLEEP WHILE SITTING AND READING: MODERATE CHANCE OF DOZING

## 2025-05-21 NOTE — PROGRESS NOTES
MA Communication:  The following orders are received by verbal communication from Ruel Dang MD        Orders include:        HST follow up with Dr Luna or NP

## 2025-05-21 NOTE — PROGRESS NOTES
SLEEP MEDICINE CONSULT NOTE  CC & Referring provider: Patient is being seen at the request of Iliana Mc for a consultation to evaluate for sleep apnea.     Presenting HPI 5/21/25:   History of Present Illness  The patient presents for evaluation of sleep apnea.    He has been receiving notifications on his Apple watch indicating sleep apnea. His spouse has observed apneic episodes during sleep. Despite these symptoms, he feels well-rested during the day and does not experience excessive daytime sleepiness or falling asleep while driving.    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    SOCIAL HISTORY  - Works in construction     ~6-8 hrs of sleep per night  Bedtime 8 pm & rise time 4 am, works construction  Restroom 0 x/night     0 naps during the day    No car wrecks/near wrecks because of the sleepiness or nodding off while driving.           5/21/2025     3:22 PM   Sleep Medicine   Sitting and reading 2   Watching TV 0   Sitting, inactive in a public place (e.g. a theatre or a meeting) 0   As a passenger in a car for an hour without a break 2   Lying down to rest in the afternoon when circumstances permit 2   Sitting and talking to someone 0   Sitting quietly after a lunch without alcohol 0   In a car, while stopped for a few minutes in traffic 0   [place] Sleepiness Score 6   Neck (Inches) 18        No past medical history on file.  Past Surgical History:   Procedure Laterality Date    COLONOSCOPY N/A 7/26/2024    COLONOSCOPY performed by Wade Esquivel MD at [facility] ENDOSCOPY    INGUINAL HERNIA REPAIR Left 1984    LUMBAR SPINE SURGERY  01/01/1998    for spondylolisthesis - birth defect     Medication list was reviewed and updated as needed in Epic.   reports that he has never smoked. He has never used smokeless tobacco.  family history includes Diabetes in his mother; Liver Cancer in his paternal

## 2025-05-21 NOTE — PATIENT INSTRUCTIONS
vehicle while drowsy or sleepy.  Maintaining an optimal weight can help limit the severity of sleep apnea.  Treating sleep apnea effectively may help reduce the risk of heart disease, stroke, car accidents, type II diabetes & all cause mortality    Important practices for better sleep:  Have a fixed bedtime and awakening time.  A regular routine is critical to good sleep. Deviate from your typical go to bed and rise times by no more than 1 hour.  Go to bed only when sleepy & only when it is near to your typical go to sleep time; this reduces the time you are awake in bed (which can lead to frustration and negative thoughts about sleep). If you can't fall asleep within 15-30 minutes, get up and do something boring until you feel sleepy again (but no electronic devices).   Avoid naps. This will ensure you are sleepy at bedtime.  If you have to take a nap, sleep 30 minutes or less before 3 pm.  Avoid exposure to electronics/screens when less than 2 hours from you desired go to sleep time.   Only use your bed for sleeping & intimacy. Do not use your bed as an office, workroom or recreation room.    Develop sleep rituals that you go through the same way every night    Stay away from stimulants such as caffeine and nicotine. Caffeine can remain in your system for well over 10 hours, so no caffeine after lunch. Caffeine is found in tea, cola, coffee, cocoa and chocolate.    Avoid alcohol 2-4 hours before bedtime. As alcohol levels fall there is a stimulant or \"wake-up\" effect and this may cause fragmented sleep.   Don’t take worries to bed. Leave worries about life, work, school etc. behind you when you go to bed.  Consider developing a daily meditation exercise.  Ensure your bedroom is quiet and comfortable. A cool, dark room is recommended. A white noise machine can help eliminate awakenings due to sounds.        Please remember to bring a list of medications and any CPAP or BiPAP machines to your next appointment with

## 2025-06-09 ENCOUNTER — HOSPITAL ENCOUNTER (OUTPATIENT)
Dept: SLEEP CENTER | Age: 45
Discharge: HOME | End: 2025-06-11
Payer: COMMERCIAL

## 2025-06-09 DIAGNOSIS — G47.30 SLEEP APNEA, UNSPECIFIED TYPE: ICD-10-CM

## 2025-06-09 PROCEDURE — 95806 SLEEP STUDY UNATT&RESP EFFT: CPT

## 2025-06-12 PROBLEM — G47.30 SLEEP APNEA: Status: ACTIVE | Noted: 2025-06-12

## 2025-06-13 ENCOUNTER — OFFICE VISIT (OUTPATIENT)
Dept: PULMONOLOGY | Age: 45
End: 2025-06-13
Payer: COMMERCIAL

## 2025-06-13 VITALS
OXYGEN SATURATION: 96 % | RESPIRATION RATE: 16 BRPM | SYSTOLIC BLOOD PRESSURE: 122 MMHG | DIASTOLIC BLOOD PRESSURE: 76 MMHG | BODY MASS INDEX: 35.55 KG/M2 | HEART RATE: 62 BPM | TEMPERATURE: 98 F | HEIGHT: 74 IN | WEIGHT: 277 LBS

## 2025-06-13 DIAGNOSIS — E66.01 SEVERE OBESITY (BMI 35.0-39.9) WITH COMORBIDITY (HCC): ICD-10-CM

## 2025-06-13 DIAGNOSIS — G47.33 OBSTRUCTIVE SLEEP APNEA SYNDROME: Primary | ICD-10-CM

## 2025-06-13 PROCEDURE — 99213 OFFICE O/P EST LOW 20 MIN: CPT | Performed by: NURSE PRACTITIONER

## 2025-06-13 ASSESSMENT — SLEEP AND FATIGUE QUESTIONNAIRES
HOW LIKELY ARE YOU TO NOD OFF OR FALL ASLEEP IN A CAR, WHILE STOPPED FOR A FEW MINUTES IN TRAFFIC: WOULD NEVER DOZE
HOW LIKELY ARE YOU TO NOD OFF OR FALL ASLEEP WHILE SITTING QUIETLY AFTER LUNCH WITHOUT ALCOHOL: SLIGHT CHANCE OF DOZING
HOW LIKELY ARE YOU TO NOD OFF OR FALL ASLEEP IN A CAR, WHILE STOPPED FOR A FEW MINUTES IN TRAFFIC: WOULD NEVER DOZE
HOW LIKELY ARE YOU TO NOD OFF OR FALL ASLEEP WHILE SITTING INACTIVE IN A PUBLIC PLACE: SLIGHT CHANCE OF DOZING
HOW LIKELY ARE YOU TO NOD OFF OR FALL ASLEEP WHILE SITTING AND TALKING TO SOMEONE: WOULD NEVER DOZE
HOW LIKELY ARE YOU TO NOD OFF OR FALL ASLEEP WHEN YOU ARE A PASSENGER IN A CAR FOR AN HOUR WITHOUT A BREAK: MODERATE CHANCE OF DOZING
HOW LIKELY ARE YOU TO NOD OFF OR FALL ASLEEP WHILE WATCHING TV: SLIGHT CHANCE OF DOZING
HOW LIKELY ARE YOU TO NOD OFF OR FALL ASLEEP WHEN YOU ARE A PASSENGER IN A CAR FOR AN HOUR WITHOUT A BREAK: MODERATE CHANCE OF DOZING
HOW LIKELY ARE YOU TO NOD OFF OR FALL ASLEEP WHILE LYING DOWN TO REST IN THE AFTERNOON WHEN CIRCUMSTANCES PERMIT: MODERATE CHANCE OF DOZING
HOW LIKELY ARE YOU TO NOD OFF OR FALL ASLEEP WHILE SITTING AND READING: MODERATE CHANCE OF DOZING
HOW LIKELY ARE YOU TO NOD OFF OR FALL ASLEEP WHILE SITTING AND TALKING TO SOMEONE: WOULD NEVER DOZE
HOW LIKELY ARE YOU TO NOD OFF OR FALL ASLEEP WHILE SITTING QUIETLY AFTER LUNCH WITHOUT ALCOHOL: SLIGHT CHANCE OF DOZING
HOW LIKELY ARE YOU TO NOD OFF OR FALL ASLEEP WHILE SITTING INACTIVE IN A PUBLIC PLACE: SLIGHT CHANCE OF DOZING
HOW LIKELY ARE YOU TO NOD OFF OR FALL ASLEEP WHILE LYING DOWN TO REST IN THE AFTERNOON WHEN CIRCUMSTANCES PERMIT: MODERATE CHANCE OF DOZING
HOW LIKELY ARE YOU TO NOD OFF OR FALL ASLEEP WHILE WATCHING TV: SLIGHT CHANCE OF DOZING
HOW LIKELY ARE YOU TO NOD OFF OR FALL ASLEEP WHILE SITTING AND READING: MODERATE CHANCE OF DOZING
ESS TOTAL SCORE: 9

## 2025-06-13 ASSESSMENT — ENCOUNTER SYMPTOMS
ABDOMINAL PAIN: 0
WHEEZING: 0
CHEST TIGHTNESS: 0
EYE PAIN: 0
RHINORRHEA: 0
SHORTNESS OF BREATH: 0
SORE THROAT: 0
COUGH: 0

## 2025-06-13 NOTE — PROGRESS NOTES
MA Communication: The following orders are received by verbal communication from JUAN Salas    Communication:   New DME start - pt given list - will call   31-90 day appt scheduled - Aug 2025  
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      Current Outpatient Medications   Medication Sig Dispense Refill    hydrOXYzine pamoate (VISTARIL) 25 MG capsule Take 1 capsule by mouth 3 times daily as needed for Anxiety (Patient not taking: Reported on 3/19/2025)       No current facility-administered medications for this visit.        No Known Allergies     History reviewed. No pertinent past medical history.     Past Surgical History:   Procedure Laterality Date    COLONOSCOPY N/A 7/26/2024    COLONOSCOPY performed by Wade Esquivel MD at [facility] ENDOSCOPY    INGUINAL HERNIA REPAIR Left 1984    LUMBAR SPINE SURGERY  01/01/1998    for spondylolisthesis - birth defect        Review of Systems   Constitutional:  Negative for chills, fatigue and fever.   HENT:  Negative for congestion, postnasal drip, rhinorrhea and sore throat.    Eyes:  Negative for pain and visual disturbance.   Respiratory:  Negative for cough, chest tightness, shortness of breath and wheezing.    Cardiovascular:  Negative for chest pain, palpitations and leg swelling.   Gastrointestinal:  Negative for abdominal pain.   Genitourinary:  Negative for difficulty urinating.   Musculoskeletal: Negative.    Skin: Negative.    Neurological:  Negative for dizziness, numbness and headaches.   Psychiatric/Behavioral:  The patient is not nervous/anxious.         /76 (BP Site: Left Upper Arm, Patient Position: Sitting, BP Cuff Size: Medium Adult)   Pulse 62   Temp 98 °F (36.7 °C) (Infrared)   Resp 16   Ht 1.88 m (6' 2\")   Wt 125.6 kg (277 lb)   SpO2 96%   BMI 35.56 kg/m²         DATA:     - Sleep Testing -  Diagnosed with severe obstructive sleep apnea on the study done 6/9/25. (AHI 64.9, desat to 62% ,with time at or below 89% 218 minutes, weight 275 lbs)     Physical Exam  Vitals reviewed.   Constitutional:       General: He is not in acute distress.     Appearance: Normal appearance. He is not

## 2025-06-13 NOTE — PATIENT INSTRUCTIONS
Reviewed sleep study with Francisco Ortiz.  Discussed treatment options including starting PAP therapy vs titration study. He will be started on PAP therapy.    Discussed importance of wearing PAP at least 4 hours nightly.   Will prescribe home medical equipment company to check pressures, download usage and will replace mask, tubing, disposables and filters as needed.      Discussed importance of wearing PAP nightly at least 4 hours.    Instructed to wash or wipe face of excess oil before using CPAP to prevent the mask / nasal pillow from sliding and ensure proper fit.  Empty the water daily and allow the chamber and tubings to air dry. Instructed how to properly clean the device to prevent bacteria and mold growth in the water chamber.       Advised to avoid driving if too sleepy to function safely and given a discussion of the risks of untreated apneas such as accidents, cognitive impairment, mood impairment, worsening high blood pressure, various cardiac disease and stroke.      Regarding weight,  recommend to try a formal program and/or increase physical activity by adding a 30 minute walk to daily routine. Weight loss was encouraged as a long term approach to treatment of LULY. Explained the correlation between obesity and apnea and the causative role it can play.    Follow up 2-3 months  or sooner for any issues.   no

## 2025-08-21 ASSESSMENT — ENCOUNTER SYMPTOMS
SHORTNESS OF BREATH: 0
ABDOMINAL PAIN: 0
SORE THROAT: 0
CHEST TIGHTNESS: 0
RHINORRHEA: 0
EYE PAIN: 0
COUGH: 0
WHEEZING: 0

## 2025-08-22 ENCOUNTER — OFFICE VISIT (OUTPATIENT)
Dept: PULMONOLOGY | Age: 45
End: 2025-08-22
Payer: COMMERCIAL

## 2025-08-22 VITALS
HEIGHT: 74 IN | WEIGHT: 282 LBS | SYSTOLIC BLOOD PRESSURE: 126 MMHG | BODY MASS INDEX: 36.19 KG/M2 | TEMPERATURE: 97 F | OXYGEN SATURATION: 96 % | DIASTOLIC BLOOD PRESSURE: 78 MMHG | HEART RATE: 60 BPM | RESPIRATION RATE: 18 BRPM

## 2025-08-22 DIAGNOSIS — G47.33 OBSTRUCTIVE SLEEP APNEA SYNDROME: Primary | ICD-10-CM

## 2025-08-22 DIAGNOSIS — E66.01 SEVERE OBESITY (BMI 35.0-39.9) WITH COMORBIDITY (HCC): ICD-10-CM

## 2025-08-22 PROCEDURE — 99213 OFFICE O/P EST LOW 20 MIN: CPT | Performed by: NURSE PRACTITIONER

## 2025-08-22 ASSESSMENT — ENCOUNTER SYMPTOMS: BACK PAIN: 1

## 2025-08-22 ASSESSMENT — SLEEP AND FATIGUE QUESTIONNAIRES
HOW LIKELY ARE YOU TO NOD OFF OR FALL ASLEEP WHILE SITTING INACTIVE IN A PUBLIC PLACE: WOULD NEVER DOZE
HOW LIKELY ARE YOU TO NOD OFF OR FALL ASLEEP IN A CAR, WHILE STOPPED FOR A FEW MINUTES IN TRAFFIC: WOULD NEVER DOZE
HOW LIKELY ARE YOU TO NOD OFF OR FALL ASLEEP WHILE WATCHING TV: WOULD NEVER DOZE
HOW LIKELY ARE YOU TO NOD OFF OR FALL ASLEEP WHILE SITTING AND READING: MODERATE CHANCE OF DOZING
HOW LIKELY ARE YOU TO NOD OFF OR FALL ASLEEP WHEN YOU ARE A PASSENGER IN A CAR FOR AN HOUR WITHOUT A BREAK: WOULD NEVER DOZE
ESS TOTAL SCORE: 4
HOW LIKELY ARE YOU TO NOD OFF OR FALL ASLEEP WHILE SITTING QUIETLY AFTER LUNCH WITHOUT ALCOHOL: WOULD NEVER DOZE
HOW LIKELY ARE YOU TO NOD OFF OR FALL ASLEEP WHILE LYING DOWN TO REST IN THE AFTERNOON WHEN CIRCUMSTANCES PERMIT: MODERATE CHANCE OF DOZING
HOW LIKELY ARE YOU TO NOD OFF OR FALL ASLEEP WHILE SITTING AND TALKING TO SOMEONE: WOULD NEVER DOZE

## (undated) DEVICE — CANNULA NSL AD TBNG L7FT PVC STR NONFLARED PRNG O2 DEL W STD

## (undated) DEVICE — ELECTRODE,ECG,STRESS,FOAM,3PK: Brand: MEDLINE

## (undated) DEVICE — ENDOSCOPIC KIT 2 12 FT OP4 DE2 GWN SYR